# Patient Record
Sex: MALE | Race: WHITE | NOT HISPANIC OR LATINO
[De-identification: names, ages, dates, MRNs, and addresses within clinical notes are randomized per-mention and may not be internally consistent; named-entity substitution may affect disease eponyms.]

---

## 2017-02-03 ENCOUNTER — FORM ENCOUNTER (OUTPATIENT)
Age: 77
End: 2017-02-03

## 2017-02-04 ENCOUNTER — APPOINTMENT (OUTPATIENT)
Dept: CT IMAGING | Facility: IMAGING CENTER | Age: 77
End: 2017-02-04

## 2017-02-04 ENCOUNTER — OUTPATIENT (OUTPATIENT)
Dept: OUTPATIENT SERVICES | Facility: HOSPITAL | Age: 77
LOS: 1 days | End: 2017-02-04
Payer: MEDICARE

## 2017-02-04 DIAGNOSIS — C7A.8 OTHER MALIGNANT NEUROENDOCRINE TUMORS: ICD-10-CM

## 2017-02-04 PROCEDURE — 71250 CT THORAX DX C-: CPT

## 2017-02-08 ENCOUNTER — OUTPATIENT (OUTPATIENT)
Dept: OUTPATIENT SERVICES | Facility: HOSPITAL | Age: 77
LOS: 1 days | Discharge: HOME | End: 2017-02-08

## 2017-02-08 DIAGNOSIS — C85.88 OTHER SPECIFIED TYPES OF NON-HODGKIN LYMPHOMA, LYMPH NODES OF MULTIPLE SITES: ICD-10-CM

## 2017-02-09 ENCOUNTER — RESULT REVIEW (OUTPATIENT)
Age: 77
End: 2017-02-09

## 2017-02-10 ENCOUNTER — APPOINTMENT (OUTPATIENT)
Dept: HEMATOLOGY ONCOLOGY | Facility: CLINIC | Age: 77
End: 2017-02-10

## 2017-02-10 VITALS
HEART RATE: 59 BPM | RESPIRATION RATE: 16 BRPM | WEIGHT: 175.27 LBS | TEMPERATURE: 97.4 F | SYSTOLIC BLOOD PRESSURE: 184 MMHG | BODY MASS INDEX: 26.65 KG/M2 | DIASTOLIC BLOOD PRESSURE: 93 MMHG | OXYGEN SATURATION: 98 %

## 2017-02-10 DIAGNOSIS — R41.3 OTHER AMNESIA: ICD-10-CM

## 2017-02-10 DIAGNOSIS — Z00.00 ENCOUNTER FOR GENERAL ADULT MEDICAL EXAMINATION W/OUT ABNORMAL FINDINGS: ICD-10-CM

## 2017-02-10 LAB
BASOPHILS # BLD AUTO: 0 K/UL — SIGNIFICANT CHANGE UP (ref 0–0.2)
BASOPHILS NFR BLD AUTO: 0.4 % — SIGNIFICANT CHANGE UP (ref 0–2)
EOSINOPHIL # BLD AUTO: 0.2 K/UL — SIGNIFICANT CHANGE UP (ref 0–0.5)
EOSINOPHIL NFR BLD AUTO: 4.7 % — SIGNIFICANT CHANGE UP (ref 0–6)
HCT VFR BLD CALC: 39.4 % — SIGNIFICANT CHANGE UP (ref 39–50)
HGB BLD-MCNC: 13.4 G/DL — SIGNIFICANT CHANGE UP (ref 13–17)
LYMPHOCYTES # BLD AUTO: 0.9 K/UL — LOW (ref 1–3.3)
LYMPHOCYTES # BLD AUTO: 16.7 % — SIGNIFICANT CHANGE UP (ref 13–44)
MCHC RBC-ENTMCNC: 32.7 PG — SIGNIFICANT CHANGE UP (ref 27–34)
MCHC RBC-ENTMCNC: 34 G/DL — SIGNIFICANT CHANGE UP (ref 32–36)
MCV RBC AUTO: 96.2 FL — SIGNIFICANT CHANGE UP (ref 80–100)
MONOCYTES # BLD AUTO: 0.7 K/UL — SIGNIFICANT CHANGE UP (ref 0–0.9)
MONOCYTES NFR BLD AUTO: 13.9 % — SIGNIFICANT CHANGE UP (ref 2–14)
NEUTROPHILS # BLD AUTO: 3.4 K/UL — SIGNIFICANT CHANGE UP (ref 1.8–7.4)
NEUTROPHILS NFR BLD AUTO: 64.3 % — SIGNIFICANT CHANGE UP (ref 43–77)
PLATELET # BLD AUTO: 91 K/UL — LOW (ref 150–400)
RBC # BLD: 4.1 M/UL — LOW (ref 4.2–5.8)
RBC # FLD: 13.6 % — SIGNIFICANT CHANGE UP (ref 10.3–14.5)
WBC # BLD: 5.3 K/UL — SIGNIFICANT CHANGE UP (ref 3.8–10.5)
WBC # FLD AUTO: 5.3 K/UL — SIGNIFICANT CHANGE UP (ref 3.8–10.5)

## 2017-02-11 LAB
ALBUMIN SERPL ELPH-MCNC: 4.4 G/DL
ALP BLD-CCNC: 40 U/L
ALT SERPL-CCNC: 24 U/L
ANION GAP SERPL CALC-SCNC: 15 MMOL/L
AST SERPL-CCNC: 20 U/L
B2 MICROGLOB SERPL-MCNC: 4.5 MG/L
BILIRUB SERPL-MCNC: 0.7 MG/DL
BUN SERPL-MCNC: 19 MG/DL
CALCIUM SERPL-MCNC: 9.6 MG/DL
CHLORIDE SERPL-SCNC: 100 MMOL/L
CO2 SERPL-SCNC: 25 MMOL/L
CREAT SERPL-MCNC: 1.44 MG/DL
CRP SERPL-MCNC: 0.66 MG/DL
GLUCOSE SERPL-MCNC: 123 MG/DL
LDH SERPL-CCNC: 126 U/L
POTASSIUM SERPL-SCNC: 5.1 MMOL/L
PROT SERPL-MCNC: 6.9 G/DL
SODIUM SERPL-SCNC: 140 MMOL/L

## 2017-02-27 ENCOUNTER — APPOINTMENT (OUTPATIENT)
Dept: UROLOGY | Facility: CLINIC | Age: 77
End: 2017-02-27

## 2017-03-02 LAB
APPEARANCE: ABNORMAL
BACTERIA: ABNORMAL
BILIRUBIN URINE: NEGATIVE
BLOOD URINE: ABNORMAL
COLOR: YELLOW
GLUCOSE QUALITATIVE U: NORMAL MG/DL
HYALINE CASTS: 0 /LPF
KETONES URINE: NEGATIVE
LEUKOCYTE ESTERASE URINE: ABNORMAL
MICROSCOPIC-UA: NORMAL
NITRITE URINE: NEGATIVE
PH URINE: 5.5
PROTEIN URINE: 30 MG/DL
RED BLOOD CELLS URINE: 13 /HPF
SPECIFIC GRAVITY URINE: 1.03
SQUAMOUS EPITHELIAL CELLS: 4 /HPF
URINE COMMENTS: NORMAL
UROBILINOGEN URINE: NORMAL MG/DL
WHITE BLOOD CELLS URINE: 159 /HPF

## 2017-03-10 ENCOUNTER — NON-APPOINTMENT (OUTPATIENT)
Age: 77
End: 2017-03-10

## 2017-03-10 ENCOUNTER — APPOINTMENT (OUTPATIENT)
Dept: CARDIOLOGY | Facility: CLINIC | Age: 77
End: 2017-03-10

## 2017-03-10 VITALS
BODY MASS INDEX: 26.52 KG/M2 | SYSTOLIC BLOOD PRESSURE: 151 MMHG | HEIGHT: 68 IN | HEART RATE: 79 BPM | DIASTOLIC BLOOD PRESSURE: 81 MMHG | WEIGHT: 175 LBS

## 2017-03-27 ENCOUNTER — RESULT REVIEW (OUTPATIENT)
Age: 77
End: 2017-03-27

## 2017-03-27 ENCOUNTER — OUTPATIENT (OUTPATIENT)
Dept: OUTPATIENT SERVICES | Facility: HOSPITAL | Age: 77
LOS: 1 days | Discharge: HOME | End: 2017-03-27

## 2017-03-27 DIAGNOSIS — C85.88 OTHER SPECIFIED TYPES OF NON-HODGKIN LYMPHOMA, LYMPH NODES OF MULTIPLE SITES: ICD-10-CM

## 2017-03-28 ENCOUNTER — LABORATORY RESULT (OUTPATIENT)
Age: 77
End: 2017-03-28

## 2017-03-28 ENCOUNTER — APPOINTMENT (OUTPATIENT)
Dept: HEMATOLOGY ONCOLOGY | Facility: CLINIC | Age: 77
End: 2017-03-28

## 2017-03-28 VITALS
RESPIRATION RATE: 16 BRPM | HEART RATE: 82 BPM | TEMPERATURE: 97 F | DIASTOLIC BLOOD PRESSURE: 72 MMHG | WEIGHT: 174.17 LBS | BODY MASS INDEX: 26.48 KG/M2 | SYSTOLIC BLOOD PRESSURE: 130 MMHG

## 2017-03-28 DIAGNOSIS — D69.6 THROMBOCYTOPENIA, UNSPECIFIED: ICD-10-CM

## 2017-03-28 DIAGNOSIS — N39.0 URINARY TRACT INFECTION, SITE NOT SPECIFIED: ICD-10-CM

## 2017-03-28 LAB
BASOPHILS # BLD AUTO: 0 K/UL — SIGNIFICANT CHANGE UP (ref 0–0.2)
BASOPHILS NFR BLD AUTO: 0.1 % — SIGNIFICANT CHANGE UP (ref 0–2)
EOSINOPHIL # BLD AUTO: 0.4 K/UL — SIGNIFICANT CHANGE UP (ref 0–0.5)
EOSINOPHIL NFR BLD AUTO: 5.3 % — SIGNIFICANT CHANGE UP (ref 0–6)
HCT VFR BLD CALC: 40.9 % — SIGNIFICANT CHANGE UP (ref 39–50)
HGB BLD-MCNC: 14.5 G/DL — SIGNIFICANT CHANGE UP (ref 13–17)
LYMPHOCYTES # BLD AUTO: 1.4 K/UL — SIGNIFICANT CHANGE UP (ref 1–3.3)
LYMPHOCYTES # BLD AUTO: 17.8 % — SIGNIFICANT CHANGE UP (ref 13–44)
MCHC RBC-ENTMCNC: 32.7 PG — SIGNIFICANT CHANGE UP (ref 27–34)
MCHC RBC-ENTMCNC: 35.5 G/DL — SIGNIFICANT CHANGE UP (ref 32–36)
MCV RBC AUTO: 92.1 FL — SIGNIFICANT CHANGE UP (ref 80–100)
MONOCYTES # BLD AUTO: 0.7 K/UL — SIGNIFICANT CHANGE UP (ref 0–0.9)
MONOCYTES NFR BLD AUTO: 8.6 % — SIGNIFICANT CHANGE UP (ref 2–14)
NEUTROPHILS # BLD AUTO: 5.2 K/UL — SIGNIFICANT CHANGE UP (ref 1.8–7.4)
NEUTROPHILS NFR BLD AUTO: 68.2 % — SIGNIFICANT CHANGE UP (ref 43–77)
PLATELET # BLD AUTO: 124 K/UL — LOW (ref 150–400)
RBC # BLD: 4.44 M/UL — SIGNIFICANT CHANGE UP (ref 4.2–5.8)
RBC # FLD: 12.7 % — SIGNIFICANT CHANGE UP (ref 10.3–14.5)
WBC # BLD: 7.7 K/UL — SIGNIFICANT CHANGE UP (ref 3.8–10.5)
WBC # FLD AUTO: 7.7 K/UL — SIGNIFICANT CHANGE UP (ref 3.8–10.5)

## 2017-04-03 LAB
ALBUMIN SERPL ELPH-MCNC: 4.4 G/DL
ALP BLD-CCNC: 50 U/L
ALT SERPL-CCNC: 15 U/L
ANION GAP SERPL CALC-SCNC: 18 MMOL/L
APPEARANCE: ABNORMAL
APPEARANCE: CLEAR
AST SERPL-CCNC: 17 U/L
BACTERIA: NEGATIVE
BILIRUB SERPL-MCNC: 0.6 MG/DL
BILIRUBIN URINE: NEGATIVE
BILIRUBIN URINE: NEGATIVE
BLOOD URINE: NEGATIVE
BLOOD URINE: NEGATIVE
BUN SERPL-MCNC: 24 MG/DL
CALCIUM SERPL-MCNC: 10 MG/DL
CHLORIDE SERPL-SCNC: 103 MMOL/L
CO2 SERPL-SCNC: 20 MMOL/L
COLOR: YELLOW
COLOR: YELLOW
CREAT SERPL-MCNC: 1.66 MG/DL
GLUCOSE QUALITATIVE U: NORMAL MG/DL
GLUCOSE QUALITATIVE U: NORMAL MG/DL
GLUCOSE SERPL-MCNC: 153 MG/DL
HYALINE CASTS: 2 /LPF
KETONES URINE: NEGATIVE
KETONES URINE: NEGATIVE
LDH SERPL-CCNC: 129 U/L
LEUKOCYTE ESTERASE URINE: ABNORMAL
LEUKOCYTE ESTERASE URINE: ABNORMAL
MICROSCOPIC-UA: NORMAL
NITRITE URINE: NEGATIVE
NITRITE URINE: NEGATIVE
PH URINE: 5.5
PH URINE: 5.5
POTASSIUM SERPL-SCNC: 5 MMOL/L
PROT SERPL-MCNC: 7.4 G/DL
PROTEIN URINE: ABNORMAL MG/DL
PROTEIN URINE: ABNORMAL MG/DL
PSA FREE FLD-MCNC: 14.4 %
PSA FREE SERPL-MCNC: 0.07 NG/ML
PSA SERPL-MCNC: 0.51 NG/ML
RED BLOOD CELLS URINE: 3 /HPF
SODIUM SERPL-SCNC: 140 MMOL/L
SPECIFIC GRAVITY URINE: 1.02
SPECIFIC GRAVITY URINE: 1.02
SQUAMOUS EPITHELIAL CELLS: 1 /HPF
UROBILINOGEN URINE: NORMAL MG/DL
UROBILINOGEN URINE: NORMAL MG/DL
WHITE BLOOD CELLS URINE: 84 /HPF

## 2017-04-05 ENCOUNTER — APPOINTMENT (OUTPATIENT)
Dept: CV DIAGNOSITCS | Facility: HOSPITAL | Age: 77
End: 2017-04-05

## 2017-05-10 ENCOUNTER — APPOINTMENT (OUTPATIENT)
Dept: UROLOGY | Facility: CLINIC | Age: 77
End: 2017-05-10

## 2017-05-10 DIAGNOSIS — R31.0 GROSS HEMATURIA: ICD-10-CM

## 2017-05-12 LAB
APPEARANCE: ABNORMAL
BACTERIA: ABNORMAL
BILIRUBIN URINE: NEGATIVE
BLOOD URINE: NEGATIVE
COLOR: YELLOW
GLUCOSE QUALITATIVE U: NORMAL MG/DL
HYALINE CASTS: 0 /LPF
KETONES URINE: NEGATIVE
LEUKOCYTE ESTERASE URINE: ABNORMAL
MICROSCOPIC-UA: NORMAL
NITRITE URINE: NEGATIVE
PH URINE: 5.5
PROTEIN URINE: 30 MG/DL
RED BLOOD CELLS URINE: 2 /HPF
SPECIFIC GRAVITY URINE: 1.02
SQUAMOUS EPITHELIAL CELLS: 1 /HPF
UROBILINOGEN URINE: NORMAL MG/DL
WHITE BLOOD CELLS URINE: 128 /HPF

## 2017-06-03 ENCOUNTER — APPOINTMENT (OUTPATIENT)
Dept: CT IMAGING | Facility: IMAGING CENTER | Age: 77
End: 2017-06-03

## 2017-08-07 ENCOUNTER — FORM ENCOUNTER (OUTPATIENT)
Age: 77
End: 2017-08-07

## 2017-08-07 ENCOUNTER — OUTPATIENT (OUTPATIENT)
Dept: OUTPATIENT SERVICES | Facility: HOSPITAL | Age: 77
LOS: 1 days | Discharge: HOME | End: 2017-08-07

## 2017-08-07 DIAGNOSIS — C85.88 OTHER SPECIFIED TYPES OF NON-HODGKIN LYMPHOMA, LYMPH NODES OF MULTIPLE SITES: ICD-10-CM

## 2017-08-08 ENCOUNTER — OUTPATIENT (OUTPATIENT)
Dept: OUTPATIENT SERVICES | Facility: HOSPITAL | Age: 77
LOS: 1 days | End: 2017-08-08
Payer: MEDICARE

## 2017-08-08 ENCOUNTER — RESULT REVIEW (OUTPATIENT)
Age: 77
End: 2017-08-08

## 2017-08-08 ENCOUNTER — APPOINTMENT (OUTPATIENT)
Dept: CT IMAGING | Facility: IMAGING CENTER | Age: 77
End: 2017-08-08
Payer: MEDICARE

## 2017-08-08 ENCOUNTER — APPOINTMENT (OUTPATIENT)
Dept: HEMATOLOGY ONCOLOGY | Facility: CLINIC | Age: 77
End: 2017-08-08

## 2017-08-08 DIAGNOSIS — C34.91 MALIGNANT NEOPLASM OF UNSPECIFIED PART OF RIGHT BRONCHUS OR LUNG: ICD-10-CM

## 2017-08-08 LAB
ALBUMIN SERPL ELPH-MCNC: 4.6 G/DL
ALP BLD-CCNC: 40 U/L
ALT SERPL-CCNC: 18 U/L
ANION GAP SERPL CALC-SCNC: 18 MMOL/L
AST SERPL-CCNC: 21 U/L
B2 MICROGLOB SERPL-MCNC: 5.6 MG/L
BASOPHILS # BLD AUTO: 0 K/UL — SIGNIFICANT CHANGE UP (ref 0–0.2)
BASOPHILS NFR BLD AUTO: 0.4 % — SIGNIFICANT CHANGE UP (ref 0–2)
BILIRUB SERPL-MCNC: 1.3 MG/DL
BUN SERPL-MCNC: 21 MG/DL
CALCIUM SERPL-MCNC: 10.2 MG/DL
CHLORIDE SERPL-SCNC: 99 MMOL/L
CO2 SERPL-SCNC: 21 MMOL/L
CREAT SERPL-MCNC: 1.85 MG/DL
CRP SERPL-MCNC: 1 MG/DL
EOSINOPHIL # BLD AUTO: 0.2 K/UL — SIGNIFICANT CHANGE UP (ref 0–0.5)
EOSINOPHIL NFR BLD AUTO: 4.3 % — SIGNIFICANT CHANGE UP (ref 0–6)
GLUCOSE SERPL-MCNC: 126 MG/DL
HCT VFR BLD CALC: 42 % — SIGNIFICANT CHANGE UP (ref 39–50)
HGB BLD-MCNC: 13.9 G/DL — SIGNIFICANT CHANGE UP (ref 13–17)
LDH SERPL-CCNC: 147 U/L
LYMPHOCYTES # BLD AUTO: 0.8 K/UL — LOW (ref 1–3.3)
LYMPHOCYTES # BLD AUTO: 18.3 % — SIGNIFICANT CHANGE UP (ref 13–44)
MCHC RBC-ENTMCNC: 32.1 PG — SIGNIFICANT CHANGE UP (ref 27–34)
MCHC RBC-ENTMCNC: 33.1 G/DL — SIGNIFICANT CHANGE UP (ref 32–36)
MCV RBC AUTO: 97.2 FL — SIGNIFICANT CHANGE UP (ref 80–100)
MONOCYTES # BLD AUTO: 0.6 K/UL — SIGNIFICANT CHANGE UP (ref 0–0.9)
MONOCYTES NFR BLD AUTO: 13 % — SIGNIFICANT CHANGE UP (ref 2–14)
NEUTROPHILS # BLD AUTO: 2.9 K/UL — SIGNIFICANT CHANGE UP (ref 1.8–7.4)
NEUTROPHILS NFR BLD AUTO: 64.1 % — SIGNIFICANT CHANGE UP (ref 43–77)
PLATELET # BLD AUTO: 84 K/UL — LOW (ref 150–400)
POTASSIUM SERPL-SCNC: 5 MMOL/L
PROT SERPL-MCNC: 7.4 G/DL
RBC # BLD: 4.32 M/UL — SIGNIFICANT CHANGE UP (ref 4.2–5.8)
RBC # FLD: 15.4 % — HIGH (ref 10.3–14.5)
SODIUM SERPL-SCNC: 138 MMOL/L
URATE SERPL-MCNC: 7.5 MG/DL
WBC # BLD: 4.6 K/UL — SIGNIFICANT CHANGE UP (ref 3.8–10.5)
WBC # FLD AUTO: 4.6 K/UL — SIGNIFICANT CHANGE UP (ref 3.8–10.5)

## 2017-08-08 PROCEDURE — 74176 CT ABD & PELVIS W/O CONTRAST: CPT | Mod: 26

## 2017-08-08 PROCEDURE — 71250 CT THORAX DX C-: CPT | Mod: 26

## 2017-08-08 PROCEDURE — 71250 CT THORAX DX C-: CPT

## 2017-08-08 PROCEDURE — 74176 CT ABD & PELVIS W/O CONTRAST: CPT

## 2017-08-12 LAB — CEA SERPL-MCNC: 1.3 NG/ML

## 2017-09-06 ENCOUNTER — APPOINTMENT (OUTPATIENT)
Dept: UROLOGY | Facility: CLINIC | Age: 77
End: 2017-09-06
Payer: MEDICARE

## 2017-09-06 PROCEDURE — 99213 OFFICE O/P EST LOW 20 MIN: CPT

## 2017-09-06 PROCEDURE — 51798 US URINE CAPACITY MEASURE: CPT

## 2017-09-11 LAB
CREAT SERPL-MCNC: 1.61 MG/DL
PSA FREE FLD-MCNC: 14.8
PSA FREE SERPL-MCNC: 0.08 NG/ML
PSA SERPL-MCNC: 0.54 NG/ML
TESTOST SERPL-MCNC: 289.8 NG/DL

## 2017-09-26 ENCOUNTER — APPOINTMENT (OUTPATIENT)
Dept: NEUROLOGY | Facility: CLINIC | Age: 77
End: 2017-09-26
Payer: MEDICARE

## 2017-09-26 VITALS
BODY MASS INDEX: 26.37 KG/M2 | HEIGHT: 68 IN | DIASTOLIC BLOOD PRESSURE: 79 MMHG | SYSTOLIC BLOOD PRESSURE: 145 MMHG | HEART RATE: 71 BPM | WEIGHT: 174 LBS

## 2017-09-26 PROCEDURE — 99205 OFFICE O/P NEW HI 60 MIN: CPT

## 2017-10-05 DIAGNOSIS — R42 DIZZINESS AND GIDDINESS: ICD-10-CM

## 2017-10-05 DIAGNOSIS — R26.9 UNSPECIFIED ABNORMALITIES OF GAIT AND MOBILITY: ICD-10-CM

## 2017-11-13 ENCOUNTER — APPOINTMENT (OUTPATIENT)
Dept: UROLOGY | Facility: CLINIC | Age: 77
End: 2017-11-13

## 2017-11-22 ENCOUNTER — OUTPATIENT (OUTPATIENT)
Dept: OUTPATIENT SERVICES | Facility: HOSPITAL | Age: 77
LOS: 1 days | Discharge: HOME | End: 2017-11-22

## 2017-11-22 DIAGNOSIS — C85.88 OTHER SPECIFIED TYPES OF NON-HODGKIN LYMPHOMA, LYMPH NODES OF MULTIPLE SITES: ICD-10-CM

## 2017-11-28 ENCOUNTER — APPOINTMENT (OUTPATIENT)
Dept: HEMATOLOGY ONCOLOGY | Facility: CLINIC | Age: 77
End: 2017-11-28

## 2017-12-03 ENCOUNTER — FORM ENCOUNTER (OUTPATIENT)
Age: 77
End: 2017-12-03

## 2017-12-04 ENCOUNTER — APPOINTMENT (OUTPATIENT)
Dept: CT IMAGING | Facility: IMAGING CENTER | Age: 77
End: 2017-12-04
Payer: MEDICARE

## 2017-12-04 ENCOUNTER — OUTPATIENT (OUTPATIENT)
Dept: OUTPATIENT SERVICES | Facility: HOSPITAL | Age: 77
LOS: 1 days | End: 2017-12-04
Payer: MEDICARE

## 2017-12-04 DIAGNOSIS — C34.91 MALIGNANT NEOPLASM OF UNSPECIFIED PART OF RIGHT BRONCHUS OR LUNG: ICD-10-CM

## 2017-12-04 PROCEDURE — 74177 CT ABD & PELVIS W/CONTRAST: CPT

## 2017-12-04 PROCEDURE — 71260 CT THORAX DX C+: CPT | Mod: 26

## 2017-12-04 PROCEDURE — 71260 CT THORAX DX C+: CPT

## 2017-12-04 PROCEDURE — 82565 ASSAY OF CREATININE: CPT

## 2017-12-04 PROCEDURE — 74177 CT ABD & PELVIS W/CONTRAST: CPT | Mod: 26

## 2017-12-08 ENCOUNTER — APPOINTMENT (OUTPATIENT)
Dept: HEMATOLOGY ONCOLOGY | Facility: CLINIC | Age: 77
End: 2017-12-08

## 2017-12-13 ENCOUNTER — APPOINTMENT (OUTPATIENT)
Dept: UROLOGY | Facility: CLINIC | Age: 77
End: 2017-12-13

## 2018-01-17 ENCOUNTER — APPOINTMENT (OUTPATIENT)
Dept: UROLOGY | Facility: CLINIC | Age: 78
End: 2018-01-17
Payer: MEDICARE

## 2018-01-17 PROCEDURE — 51798 US URINE CAPACITY MEASURE: CPT

## 2018-01-17 PROCEDURE — 99213 OFFICE O/P EST LOW 20 MIN: CPT

## 2018-01-19 LAB
PSA FREE FLD-MCNC: 13.3
PSA FREE SERPL-MCNC: 0.12 NG/ML
PSA SERPL-MCNC: 0.9 NG/ML
TESTOST SERPL-MCNC: 239.9 NG/DL

## 2018-02-15 ENCOUNTER — FORM ENCOUNTER (OUTPATIENT)
Age: 78
End: 2018-02-15

## 2018-02-16 ENCOUNTER — OUTPATIENT (OUTPATIENT)
Dept: OUTPATIENT SERVICES | Facility: HOSPITAL | Age: 78
LOS: 1 days | End: 2018-02-16
Payer: MEDICARE

## 2018-02-16 ENCOUNTER — APPOINTMENT (OUTPATIENT)
Dept: CT IMAGING | Facility: IMAGING CENTER | Age: 78
End: 2018-02-16
Payer: MEDICARE

## 2018-02-16 DIAGNOSIS — C34.91 MALIGNANT NEOPLASM OF UNSPECIFIED PART OF RIGHT BRONCHUS OR LUNG: ICD-10-CM

## 2018-02-16 PROCEDURE — 71260 CT THORAX DX C+: CPT

## 2018-02-16 PROCEDURE — 71260 CT THORAX DX C+: CPT | Mod: 26

## 2018-02-16 PROCEDURE — 74177 CT ABD & PELVIS W/CONTRAST: CPT

## 2018-02-16 PROCEDURE — 74177 CT ABD & PELVIS W/CONTRAST: CPT | Mod: 26

## 2018-02-16 PROCEDURE — 82565 ASSAY OF CREATININE: CPT

## 2018-02-21 ENCOUNTER — APPOINTMENT (OUTPATIENT)
Dept: ULTRASOUND IMAGING | Facility: IMAGING CENTER | Age: 78
End: 2018-02-21
Payer: MEDICARE

## 2018-02-21 ENCOUNTER — OUTPATIENT (OUTPATIENT)
Dept: OUTPATIENT SERVICES | Facility: HOSPITAL | Age: 78
LOS: 1 days | End: 2018-02-21
Payer: MEDICARE

## 2018-02-21 DIAGNOSIS — Z00.8 ENCOUNTER FOR OTHER GENERAL EXAMINATION: ICD-10-CM

## 2018-02-21 PROCEDURE — 76700 US EXAM ABDOM COMPLETE: CPT

## 2018-02-21 PROCEDURE — 76700 US EXAM ABDOM COMPLETE: CPT | Mod: 26

## 2018-04-03 ENCOUNTER — OUTPATIENT (OUTPATIENT)
Dept: OUTPATIENT SERVICES | Facility: HOSPITAL | Age: 78
LOS: 1 days | Discharge: HOME | End: 2018-04-03

## 2018-04-03 DIAGNOSIS — C85.88 OTHER SPECIFIED TYPES OF NON-HODGKIN LYMPHOMA, LYMPH NODES OF MULTIPLE SITES: ICD-10-CM

## 2018-04-06 ENCOUNTER — RESULT REVIEW (OUTPATIENT)
Age: 78
End: 2018-04-06

## 2018-04-06 ENCOUNTER — APPOINTMENT (OUTPATIENT)
Dept: HEMATOLOGY ONCOLOGY | Facility: CLINIC | Age: 78
End: 2018-04-06
Payer: MEDICARE

## 2018-04-06 VITALS
DIASTOLIC BLOOD PRESSURE: 80 MMHG | SYSTOLIC BLOOD PRESSURE: 128 MMHG | BODY MASS INDEX: 26.61 KG/M2 | TEMPERATURE: 97.9 F | OXYGEN SATURATION: 98 % | HEART RATE: 78 BPM | RESPIRATION RATE: 16 BRPM | WEIGHT: 175 LBS

## 2018-04-06 DIAGNOSIS — R13.10 DYSPHAGIA, UNSPECIFIED: ICD-10-CM

## 2018-04-06 LAB
BASOPHILS # BLD AUTO: 0 K/UL — SIGNIFICANT CHANGE UP (ref 0–0.2)
BASOPHILS NFR BLD AUTO: 0.3 % — SIGNIFICANT CHANGE UP (ref 0–2)
EOSINOPHIL # BLD AUTO: 0.2 K/UL — SIGNIFICANT CHANGE UP (ref 0–0.5)
EOSINOPHIL NFR BLD AUTO: 4.1 % — SIGNIFICANT CHANGE UP (ref 0–6)
HCT VFR BLD CALC: 39.7 % — SIGNIFICANT CHANGE UP (ref 39–50)
HGB BLD-MCNC: 14 G/DL — SIGNIFICANT CHANGE UP (ref 13–17)
LYMPHOCYTES # BLD AUTO: 1 K/UL — SIGNIFICANT CHANGE UP (ref 1–3.3)
LYMPHOCYTES # BLD AUTO: 15.8 % — SIGNIFICANT CHANGE UP (ref 13–44)
MCHC RBC-ENTMCNC: 32 PG — SIGNIFICANT CHANGE UP (ref 27–34)
MCHC RBC-ENTMCNC: 35.2 G/DL — SIGNIFICANT CHANGE UP (ref 32–36)
MCV RBC AUTO: 90.9 FL — SIGNIFICANT CHANGE UP (ref 80–100)
MONOCYTES # BLD AUTO: 0.6 K/UL — SIGNIFICANT CHANGE UP (ref 0–0.9)
MONOCYTES NFR BLD AUTO: 10.7 % — SIGNIFICANT CHANGE UP (ref 2–14)
NEUTROPHILS # BLD AUTO: 4.1 K/UL — SIGNIFICANT CHANGE UP (ref 1.8–7.4)
NEUTROPHILS NFR BLD AUTO: 69.1 % — SIGNIFICANT CHANGE UP (ref 43–77)
PLATELET # BLD AUTO: 101 K/UL — LOW (ref 150–400)
RBC # BLD: 4.37 M/UL — SIGNIFICANT CHANGE UP (ref 4.2–5.8)
RBC # FLD: 12.9 % — SIGNIFICANT CHANGE UP (ref 10.3–14.5)
WBC # BLD: 6 K/UL — SIGNIFICANT CHANGE UP (ref 3.8–10.5)
WBC # FLD AUTO: 6 K/UL — SIGNIFICANT CHANGE UP (ref 3.8–10.5)

## 2018-04-06 PROCEDURE — 99215 OFFICE O/P EST HI 40 MIN: CPT

## 2018-04-08 LAB
ALBUMIN SERPL ELPH-MCNC: 4.5 G/DL
ALP BLD-CCNC: 46 U/L
ALT SERPL-CCNC: 12 U/L
ANION GAP SERPL CALC-SCNC: 16 MMOL/L
AST SERPL-CCNC: 13 U/L
BILIRUB SERPL-MCNC: 0.7 MG/DL
BUN SERPL-MCNC: 22 MG/DL
CALCIUM SERPL-MCNC: 10.5 MG/DL
CEA SERPL-MCNC: 1.4 NG/ML
CHLORIDE SERPL-SCNC: 100 MMOL/L
CO2 SERPL-SCNC: 21 MMOL/L
CREAT SERPL-MCNC: 1.58 MG/DL
ESTIMATED AVERAGE GLUCOSE: 146 MG/DL
GLUCOSE SERPL-MCNC: 152 MG/DL
HBA1C MFR BLD HPLC: 6.7 %
LDH SERPL-CCNC: 125 U/L
POTASSIUM SERPL-SCNC: 4.5 MMOL/L
PROT SERPL-MCNC: 7.4 G/DL
PSA SERPL-MCNC: 0.45 NG/ML
SODIUM SERPL-SCNC: 137 MMOL/L

## 2018-04-18 ENCOUNTER — APPOINTMENT (OUTPATIENT)
Dept: PHYSICAL MEDICINE AND REHAB | Facility: CLINIC | Age: 78
End: 2018-04-18

## 2018-04-18 ENCOUNTER — APPOINTMENT (OUTPATIENT)
Dept: PHYSICAL MEDICINE AND REHAB | Facility: CLINIC | Age: 78
End: 2018-04-18
Payer: MEDICARE

## 2018-04-18 VITALS
TEMPERATURE: 97.1 F | HEART RATE: 70 BPM | OXYGEN SATURATION: 96 % | DIASTOLIC BLOOD PRESSURE: 71 MMHG | SYSTOLIC BLOOD PRESSURE: 125 MMHG

## 2018-04-18 PROCEDURE — 99204 OFFICE O/P NEW MOD 45 MIN: CPT

## 2018-05-07 ENCOUNTER — FORM ENCOUNTER (OUTPATIENT)
Age: 78
End: 2018-05-07

## 2018-05-08 ENCOUNTER — APPOINTMENT (OUTPATIENT)
Dept: RADIOLOGY | Facility: IMAGING CENTER | Age: 78
End: 2018-05-08
Payer: MEDICARE

## 2018-05-08 ENCOUNTER — OUTPATIENT (OUTPATIENT)
Dept: OUTPATIENT SERVICES | Facility: HOSPITAL | Age: 78
LOS: 1 days | End: 2018-05-08
Payer: MEDICARE

## 2018-05-08 DIAGNOSIS — Z00.8 ENCOUNTER FOR OTHER GENERAL EXAMINATION: ICD-10-CM

## 2018-05-08 PROCEDURE — 71046 X-RAY EXAM CHEST 2 VIEWS: CPT | Mod: 26

## 2018-05-08 PROCEDURE — 71046 X-RAY EXAM CHEST 2 VIEWS: CPT

## 2018-05-16 ENCOUNTER — APPOINTMENT (OUTPATIENT)
Dept: PHYSICAL MEDICINE AND REHAB | Facility: CLINIC | Age: 78
End: 2018-05-16

## 2018-05-23 ENCOUNTER — APPOINTMENT (OUTPATIENT)
Dept: PHYSICAL MEDICINE AND REHAB | Facility: CLINIC | Age: 78
End: 2018-05-23

## 2018-08-24 ENCOUNTER — APPOINTMENT (OUTPATIENT)
Dept: CARDIOTHORACIC SURGERY | Facility: CLINIC | Age: 78
End: 2018-08-24
Payer: MEDICARE

## 2018-08-27 ENCOUNTER — NON-APPOINTMENT (OUTPATIENT)
Age: 78
End: 2018-08-27

## 2018-08-27 ENCOUNTER — APPOINTMENT (OUTPATIENT)
Dept: CARDIOTHORACIC SURGERY | Facility: CLINIC | Age: 78
End: 2018-08-27
Payer: MEDICARE

## 2018-08-27 VITALS
HEART RATE: 73 BPM | BODY MASS INDEX: 26.67 KG/M2 | WEIGHT: 176 LBS | DIASTOLIC BLOOD PRESSURE: 81 MMHG | SYSTOLIC BLOOD PRESSURE: 131 MMHG | RESPIRATION RATE: 14 BRPM | HEIGHT: 68 IN | TEMPERATURE: 98.2 F | OXYGEN SATURATION: 99 %

## 2018-08-27 DIAGNOSIS — Z01.810 ENCOUNTER FOR PREPROCEDURAL CARDIOVASCULAR EXAMINATION: ICD-10-CM

## 2018-08-27 PROCEDURE — 99214 OFFICE O/P EST MOD 30 MIN: CPT | Mod: 25

## 2018-08-27 PROCEDURE — 93000 ELECTROCARDIOGRAM COMPLETE: CPT

## 2018-08-27 RX ORDER — TAMSULOSIN HYDROCHLORIDE 0.4 MG/1
0.4 CAPSULE ORAL
Qty: 90 | Refills: 3 | Status: DISCONTINUED | COMMUNITY
Start: 2018-01-17 | End: 2018-08-27

## 2018-08-27 RX ORDER — HYDROCODONE BITARTRATE AND ACETAMINOPHEN 5; 325 MG/1; MG/1
5-325 TABLET ORAL
Qty: 12 | Refills: 0 | Status: DISCONTINUED | COMMUNITY
Start: 2018-07-10

## 2018-08-27 RX ORDER — GABAPENTIN 600 MG/1
600 TABLET, COATED ORAL
Qty: 60 | Refills: 0 | Status: DISCONTINUED | COMMUNITY
Start: 2017-10-31

## 2018-08-27 RX ORDER — CIPROFLOXACIN HYDROCHLORIDE 500 MG/1
500 TABLET, FILM COATED ORAL TWICE DAILY
Qty: 14 | Refills: 0 | Status: DISCONTINUED | COMMUNITY
Start: 2017-03-31 | End: 2018-08-27

## 2018-08-27 RX ORDER — ACETAMINOPHEN AND CODEINE 300; 30 MG/1; MG/1
300-30 TABLET ORAL 4 TIMES DAILY
Qty: 20 | Refills: 0 | Status: DISCONTINUED | COMMUNITY
Start: 2018-04-24 | End: 2018-08-27

## 2018-08-27 RX ORDER — GABAPENTIN 300 MG/1
300 CAPSULE ORAL
Qty: 90 | Refills: 0 | Status: DISCONTINUED | COMMUNITY
Start: 2018-04-18 | End: 2018-08-27

## 2018-08-27 RX ORDER — BETAMETHASONE DIPROPIONATE 0.5 MG/G
0.05 CREAM TOPICAL
Qty: 45 | Refills: 0 | Status: DISCONTINUED | COMMUNITY
Start: 2018-04-04

## 2018-08-27 RX ORDER — FLUTICASONE PROPIONATE 50 UG/1
50 SPRAY, METERED NASAL
Qty: 16 | Refills: 0 | Status: DISCONTINUED | COMMUNITY
Start: 2018-04-23

## 2018-08-27 RX ORDER — ALBUTEROL SULFATE 90 UG/1
108 (90 BASE) AEROSOL, METERED RESPIRATORY (INHALATION)
Qty: 8 | Refills: 0 | Status: DISCONTINUED | COMMUNITY
Start: 2017-11-17

## 2018-08-27 RX ORDER — TRAMADOL HYDROCHLORIDE AND ACETAMINOPHEN 37.5; 325 MG/1; MG/1
37.5-325 TABLET, FILM COATED ORAL
Qty: 60 | Refills: 0 | Status: DISCONTINUED | COMMUNITY
Start: 2018-04-06 | End: 2018-08-27

## 2018-08-27 RX ORDER — TRAMADOL HYDROCHLORIDE 50 MG/1
50 TABLET, COATED ORAL
Qty: 12 | Refills: 0 | Status: DISCONTINUED | COMMUNITY
Start: 2018-06-28

## 2018-08-27 RX ORDER — CARVEDILOL 12.5 MG/1
12.5 TABLET, FILM COATED ORAL
Qty: 60 | Refills: 0 | Status: DISCONTINUED | COMMUNITY
Start: 2018-03-28

## 2018-08-27 RX ORDER — CIPROFLOXACIN 3 MG/ML
0.3 SOLUTION OPHTHALMIC
Qty: 5 | Refills: 0 | Status: DISCONTINUED | COMMUNITY
Start: 2018-08-21

## 2018-08-27 RX ORDER — METHYLPREDNISOLONE 4 MG/1
4 TABLET ORAL
Qty: 21 | Refills: 0 | Status: DISCONTINUED | COMMUNITY
Start: 2018-04-25

## 2018-08-27 RX ORDER — CARVEDILOL 25 MG/1
25 TABLET, FILM COATED ORAL
Qty: 60 | Refills: 5 | Status: DISCONTINUED | COMMUNITY
End: 2018-08-27

## 2018-08-27 RX ORDER — HYDROCORTISONE 1 %
12 CREAM (GRAM) TOPICAL
Qty: 400 | Refills: 0 | Status: DISCONTINUED | COMMUNITY
Start: 2018-03-13

## 2018-08-27 RX ORDER — CILOSTAZOL 100 MG/1
100 TABLET ORAL
Qty: 60 | Refills: 0 | Status: DISCONTINUED | COMMUNITY
Start: 2018-05-15

## 2018-11-04 ENCOUNTER — APPOINTMENT (OUTPATIENT)
Dept: MRI IMAGING | Facility: IMAGING CENTER | Age: 78
End: 2018-11-04
Payer: MEDICARE

## 2018-11-04 ENCOUNTER — OUTPATIENT (OUTPATIENT)
Dept: OUTPATIENT SERVICES | Facility: HOSPITAL | Age: 78
LOS: 1 days | End: 2018-11-04
Payer: MEDICARE

## 2018-11-04 DIAGNOSIS — Z00.8 ENCOUNTER FOR OTHER GENERAL EXAMINATION: ICD-10-CM

## 2018-11-04 PROCEDURE — 73721 MRI JNT OF LWR EXTRE W/O DYE: CPT

## 2018-11-04 PROCEDURE — 73721 MRI JNT OF LWR EXTRE W/O DYE: CPT | Mod: 26,RT

## 2018-11-07 ENCOUNTER — OUTPATIENT (OUTPATIENT)
Dept: OUTPATIENT SERVICES | Facility: HOSPITAL | Age: 78
LOS: 1 days | Discharge: HOME | End: 2018-11-07

## 2018-11-07 DIAGNOSIS — C85.88 OTHER SPECIFIED TYPES OF NON-HODGKIN LYMPHOMA, LYMPH NODES OF MULTIPLE SITES: ICD-10-CM

## 2018-11-16 ENCOUNTER — RESULT REVIEW (OUTPATIENT)
Age: 78
End: 2018-11-16

## 2018-11-16 ENCOUNTER — APPOINTMENT (OUTPATIENT)
Dept: HEMATOLOGY ONCOLOGY | Facility: CLINIC | Age: 78
End: 2018-11-16
Payer: MEDICARE

## 2018-11-16 VITALS
BODY MASS INDEX: 25.81 KG/M2 | RESPIRATION RATE: 16 BRPM | HEART RATE: 71 BPM | TEMPERATURE: 98.1 F | WEIGHT: 169.75 LBS | DIASTOLIC BLOOD PRESSURE: 79 MMHG | OXYGEN SATURATION: 96 % | SYSTOLIC BLOOD PRESSURE: 127 MMHG

## 2018-11-16 LAB
BASOPHILS # BLD AUTO: 0 K/UL — SIGNIFICANT CHANGE UP (ref 0–0.2)
BASOPHILS NFR BLD AUTO: 0.3 % — SIGNIFICANT CHANGE UP (ref 0–2)
EOSINOPHIL # BLD AUTO: 0.3 K/UL — SIGNIFICANT CHANGE UP (ref 0–0.5)
EOSINOPHIL NFR BLD AUTO: 4 % — SIGNIFICANT CHANGE UP (ref 0–6)
HCT VFR BLD CALC: 40.2 % — SIGNIFICANT CHANGE UP (ref 39–50)
HGB BLD-MCNC: 13.6 G/DL — SIGNIFICANT CHANGE UP (ref 13–17)
LYMPHOCYTES # BLD AUTO: 1.2 K/UL — SIGNIFICANT CHANGE UP (ref 1–3.3)
LYMPHOCYTES # BLD AUTO: 19.5 % — SIGNIFICANT CHANGE UP (ref 13–44)
MCHC RBC-ENTMCNC: 30.8 PG — SIGNIFICANT CHANGE UP (ref 27–34)
MCHC RBC-ENTMCNC: 33.9 G/DL — SIGNIFICANT CHANGE UP (ref 32–36)
MCV RBC AUTO: 91.1 FL — SIGNIFICANT CHANGE UP (ref 80–100)
MONOCYTES # BLD AUTO: 0.6 K/UL — SIGNIFICANT CHANGE UP (ref 0–0.9)
MONOCYTES NFR BLD AUTO: 10 % — SIGNIFICANT CHANGE UP (ref 2–14)
NEUTROPHILS # BLD AUTO: 4.2 K/UL — SIGNIFICANT CHANGE UP (ref 1.8–7.4)
NEUTROPHILS NFR BLD AUTO: 66.1 % — SIGNIFICANT CHANGE UP (ref 43–77)
PLATELET # BLD AUTO: 129 K/UL — LOW (ref 150–400)
RBC # BLD: 4.42 M/UL — SIGNIFICANT CHANGE UP (ref 4.2–5.8)
RBC # FLD: 12.4 % — SIGNIFICANT CHANGE UP (ref 10.3–14.5)
WBC # BLD: 6.3 K/UL — SIGNIFICANT CHANGE UP (ref 3.8–10.5)
WBC # FLD AUTO: 6.3 K/UL — SIGNIFICANT CHANGE UP (ref 3.8–10.5)

## 2018-11-16 PROCEDURE — 99214 OFFICE O/P EST MOD 30 MIN: CPT

## 2018-11-16 NOTE — HISTORY OF PRESENT ILLNESS
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 The lung cancer was diagnosed in May 2013, was resected as a T1 lesion and received 4 cycles of cisplatin and etoposide between June and September 2013. 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 Platelet count has been in the 90k/uL range and there is no evidence of bleeding or bruising. 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 I reviewed with radiology -- it showed a 3x2.5 cm mass in 2010 (retrospectively) and now is 6x4 cm. The lesion is homogeneous on scan and is not painful.\par \par I spoke with Dr. Cory cortez 3/24/15 who notes [place] 6 prostate cancer from 11/2014.  He ordered bone scan and will refer to Dr. Cardoza. Patient completed EBRT for 6 weeks in DEC 2015. Has no further diarrhea or blood in stool. Now eating well, weight is stable. \par \par \par 7/20/16 CT of chest c/w: IMPRESSION:  Emphysema  with  right  lower  lobe  prior  wedge  resection  and resolution  of  the  two  nodules  that  were  new  on  the  prior  study.  Left  lower lobe  4  mm  nodule  stable  from  several  prior  studies.  Continued  follow-up recommended.\par \par 2/10/17- CT of chest 2/4/17 without recurrence of disease. Patient reports he continues with arthralgias. Also continues with intermittent dizziness with position changes. He has not seen his cardiologist yet. Patient reports diarrhea has resolved and has been followed up with GI.\par \par 4/6/2018: Patient complain of bilateral lower lumbar pain and radiation down back of right leg from sciatic notch to lateral and anterior thigh. This makes it difficult to walk. Pain has been present for ~ 1 year but now getting worse. While in bed or sitting in chair, there is no pain. Otherwise he has no other new complaints. He had an MRI at [facility] on 3/2/18 ordered by Dr. Eddie Graves.  Various signal changes, arthritic changes and disk issues are seen. Last MRI was done at [facility] 10/2016. CAT 2/2018 of T/A/P showed stable and no evidence of relapse. Patient states he notices food stuck in throat intermittently over the past year. 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 After some "treatment" this has gotten better. Also he complain of right low back and hip pain. He had an injection to back and hip that did not help with Dr. Gutiérrez [phone].  [de-identified] : neuroendocrine, high grade

## 2018-11-16 NOTE — REVIEW OF SYSTEMS
[Fatigue] : fatigue [Constipation] : constipation [Negative] : Allergic/Immunologic [FreeTextEntry9] : back pain is worse

## 2018-11-17 LAB
ALBUMIN SERPL ELPH-MCNC: 4.5 G/DL
ALP BLD-CCNC: 56 U/L
ALT SERPL-CCNC: 10 U/L
ANION GAP SERPL CALC-SCNC: 13 MMOL/L
AST SERPL-CCNC: 11 U/L
BILIRUB SERPL-MCNC: 0.6 MG/DL
BUN SERPL-MCNC: 17 MG/DL
CALCIUM SERPL-MCNC: 10.3 MG/DL
CHLORIDE SERPL-SCNC: 102 MMOL/L
CO2 SERPL-SCNC: 24 MMOL/L
CREAT SERPL-MCNC: 1.34 MG/DL
CRP SERPL-MCNC: 0.52 MG/DL
GLUCOSE SERPL-MCNC: 131 MG/DL
LDH SERPL-CCNC: 128 U/L
POTASSIUM SERPL-SCNC: 5.1 MMOL/L
PROT SERPL-MCNC: 6.8 G/DL
PSA SERPL-MCNC: 0.33 NG/ML
SODIUM SERPL-SCNC: 139 MMOL/L
URATE SERPL-MCNC: 6.5 MG/DL

## 2018-11-28 ENCOUNTER — FORM ENCOUNTER (OUTPATIENT)
Age: 78
End: 2018-11-28

## 2018-11-29 ENCOUNTER — OUTPATIENT (OUTPATIENT)
Dept: OUTPATIENT SERVICES | Facility: HOSPITAL | Age: 78
LOS: 1 days | End: 2018-11-29
Payer: MEDICARE

## 2018-11-29 ENCOUNTER — APPOINTMENT (OUTPATIENT)
Dept: CT IMAGING | Facility: IMAGING CENTER | Age: 78
End: 2018-11-29
Payer: MEDICARE

## 2018-11-29 DIAGNOSIS — C7A.8 OTHER MALIGNANT NEUROENDOCRINE TUMORS: ICD-10-CM

## 2018-11-29 PROCEDURE — 71260 CT THORAX DX C+: CPT | Mod: 26

## 2018-11-29 PROCEDURE — 71260 CT THORAX DX C+: CPT

## 2018-11-29 PROCEDURE — 74177 CT ABD & PELVIS W/CONTRAST: CPT

## 2018-11-29 PROCEDURE — 74177 CT ABD & PELVIS W/CONTRAST: CPT | Mod: 26

## 2018-12-28 ENCOUNTER — APPOINTMENT (OUTPATIENT)
Dept: CARDIOTHORACIC SURGERY | Facility: CLINIC | Age: 78
End: 2018-12-28

## 2019-02-01 ENCOUNTER — APPOINTMENT (OUTPATIENT)
Dept: RADIOLOGY | Facility: IMAGING CENTER | Age: 79
End: 2019-02-01

## 2019-02-01 ENCOUNTER — OUTPATIENT (OUTPATIENT)
Dept: OUTPATIENT SERVICES | Facility: HOSPITAL | Age: 79
LOS: 1 days | End: 2019-02-01
Payer: MEDICARE

## 2019-02-01 DIAGNOSIS — Z00.8 ENCOUNTER FOR OTHER GENERAL EXAMINATION: ICD-10-CM

## 2019-02-01 PROCEDURE — 73502 X-RAY EXAM HIP UNI 2-3 VIEWS: CPT

## 2019-02-01 PROCEDURE — 73502 X-RAY EXAM HIP UNI 2-3 VIEWS: CPT | Mod: 26,RT

## 2019-02-07 ENCOUNTER — OUTPATIENT (OUTPATIENT)
Dept: OUTPATIENT SERVICES | Facility: HOSPITAL | Age: 79
LOS: 1 days | Discharge: HOME | End: 2019-02-07

## 2019-02-07 DIAGNOSIS — C85.88 OTHER SPECIFIED TYPES OF NON-HODGKIN LYMPHOMA, LYMPH NODES OF MULTIPLE SITES: ICD-10-CM

## 2019-02-08 ENCOUNTER — APPOINTMENT (OUTPATIENT)
Dept: HEMATOLOGY ONCOLOGY | Facility: CLINIC | Age: 79
End: 2019-02-08
Payer: MEDICARE

## 2019-02-08 VITALS
TEMPERATURE: 97.8 F | WEIGHT: 169.75 LBS | OXYGEN SATURATION: 96 % | SYSTOLIC BLOOD PRESSURE: 152 MMHG | RESPIRATION RATE: 16 BRPM | BODY MASS INDEX: 25.81 KG/M2 | DIASTOLIC BLOOD PRESSURE: 77 MMHG | HEART RATE: 73 BPM

## 2019-02-08 DIAGNOSIS — R22.41 LOCALIZED SWELLING, MASS AND LUMP, RIGHT LOWER LIMB: ICD-10-CM

## 2019-02-08 PROCEDURE — 99215 OFFICE O/P EST HI 40 MIN: CPT

## 2019-02-08 NOTE — HISTORY OF PRESENT ILLNESS
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 The lung cancer was diagnosed in May 2013, was resected as a T1 lesion and received 4 cycles of cisplatin and etoposide between June and September 2013. 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 Platelet count has been in the 90k/uL range and there is no evidence of bleeding or bruising. 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 I reviewed with radiology -- it showed a 3x2.5 cm mass in 2010 (retrospectively) and now is 6x4 cm. The lesion is homogeneous on scan and is not painful.\par \par I spoke with Dr. Cory cortez 3/24/15 who notes Nan 6 prostate cancer from 11/2014.  He ordered bone scan and will refer to Dr. Cardoza. Patient completed EBRT for 6 weeks in DEC 2015. Has no further diarrhea or blood in stool. Now eating well, weight is stable. \par \par 7/20/16 CT of chest c/w: IMPRESSION:  Emphysema  with  right  lower  lobe  prior  wedge  resection  and resolution  of  the  two  nodules  that  were  new  on  the  prior  study.  Left  lower lobe  4  mm  nodule  stable  from  several  prior  studies.  Continued  follow-up recommended.\par \par 2/10/17- CT of chest 2/4/17 without recurrence of disease. Patient reports he continues with arthralgias. Also continues with intermittent dizziness with position changes. He has not seen his cardiologist yet. Patient reports diarrhea has resolved and has been followed up with GI.\par \par 4/6/2018: Patient complain of bilateral lower lumbar pain and radiation down back of right leg from sciatic notch to lateral and anterior thigh. This makes it difficult to walk. Pain has been present for ~ 1 year but now getting worse. While in bed or sitting in chair, there is no pain. Otherwise he has no other new complaints. He had an MRI at [facility] on 3/2/18 ordered by Dr. Eddie Graves.  Various signal changes, arthritic changes and disk issues are seen. Last MRI was done at [facility] 10/2016. CAT 2/2018 of T/A/P showed stable and no evidence of relapse. Patient states he notices food stuck in throat intermittently over the past year. 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 After some "treatment" this has gotten better. Also he complain of right low back and hip pain. He had an injection to back and hip that did not help with Dr. Gutiérrez [phone]. \par \par 2/8/2019: today patient complain of pain in low back and right hip to anterior thigh. Also notes pain when stands in the left upper anterior thigh when he rises from standing. He saw Dr. Humphrey who is a physiatrist. XRAYs of right hip ordered. This was compared to CAT 11/2018 and showed some chronic changes. Patient advised to see a Neurosurgeon. [de-identified] : neuroendocrine, high grade [de-identified] : Petr Humphrey: [phone]

## 2019-02-08 NOTE — PHYSICAL EXAM
[Restricted in physically strenuous activity but ambulatory and able to carry out work of a light or sedentary nature] : Status 1- Restricted in physically strenuous activity but ambulatory and able to carry out work of a light or sedentary nature, e.g., light house work, office work [Normal Male] : prostate smooth, symmetric with no modularity or induration [Normal] : affect appropriate [de-identified] : soft mass on right thigh overlying the lateral hip, non-tender, no eccyhmosis, 3x4 fbs, no change.

## 2019-02-11 ENCOUNTER — APPOINTMENT (OUTPATIENT)
Dept: ORTHOPEDIC SURGERY | Facility: CLINIC | Age: 79
End: 2019-02-11
Payer: MEDICARE

## 2019-02-11 VITALS
HEART RATE: 72 BPM | HEIGHT: 69 IN | SYSTOLIC BLOOD PRESSURE: 167 MMHG | BODY MASS INDEX: 24.88 KG/M2 | WEIGHT: 168 LBS | DIASTOLIC BLOOD PRESSURE: 77 MMHG

## 2019-02-11 DIAGNOSIS — Z87.09 PERSONAL HISTORY OF OTHER DISEASES OF THE RESPIRATORY SYSTEM: ICD-10-CM

## 2019-02-11 DIAGNOSIS — Z78.9 OTHER SPECIFIED HEALTH STATUS: ICD-10-CM

## 2019-02-11 DIAGNOSIS — Z60.2 PROBLEMS RELATED TO LIVING ALONE: ICD-10-CM

## 2019-02-11 DIAGNOSIS — Z85.118 PERSONAL HISTORY OF OTHER MALIGNANT NEOPLASM OF BRONCHUS AND LUNG: ICD-10-CM

## 2019-02-11 DIAGNOSIS — Z86.39 PERSONAL HISTORY OF OTHER ENDOCRINE, NUTRITIONAL AND METABOLIC DISEASE: ICD-10-CM

## 2019-02-11 PROCEDURE — 99204 OFFICE O/P NEW MOD 45 MIN: CPT

## 2019-02-11 SDOH — SOCIAL STABILITY - SOCIAL INSECURITY: PROBLEMS RELATED TO LIVING ALONE: Z60.2

## 2019-02-12 PROBLEM — Z78.9 CURRENT NON-SMOKER: Status: ACTIVE | Noted: 2019-02-11

## 2019-02-12 PROBLEM — Z87.09 HISTORY OF ASTHMA: Status: RESOLVED | Noted: 2019-02-11 | Resolved: 2019-02-12

## 2019-02-12 PROBLEM — Z78.9 EXERCISES RARELY: Status: ACTIVE | Noted: 2019-02-11

## 2019-02-12 PROBLEM — Z86.39 HISTORY OF HYPERCHOLESTEROLEMIA: Status: RESOLVED | Noted: 2019-02-11 | Resolved: 2019-02-12

## 2019-02-12 PROBLEM — Z78.9 DENIES ALCOHOL CONSUMPTION: Status: ACTIVE | Noted: 2019-02-11

## 2019-02-12 PROBLEM — Z85.118 HISTORY OF MALIGNANT NEOPLASM OF LUNG: Status: RESOLVED | Noted: 2019-02-11 | Resolved: 2019-02-12

## 2019-02-12 NOTE — DATA REVIEWED
[de-identified] : Patient had MRI of the lumbar spine a year ago showing significant L3-4 L4-5 and L5-S1 discs without herniation at L4-5 impinging on the nerve roots RIGHT worse than LEFT. There is also arthritis facet joints. Multiple studies around his pelvis including MRI and CAT scan show large lipomatous lesion off of the iliac wing under the gluteus muscles on the RIGHT side. This is approximately 15 cm.

## 2019-02-12 NOTE — REVIEW OF SYSTEMS
[Chills] : no chills [Feeling Tired] : not feeling tired [Fever] : no fever [Joint Pain] : no joint pain [Nl] : Hematologic/Lymphatic

## 2019-02-12 NOTE — HISTORY OF PRESENT ILLNESS
[FreeTextEntry1] : This is a 78-year-old gentleman comes in complaining of a large mass over his RIGHT hip superiorly. He also is more complaining of bilateral lower back pain with radiculopathy. This goes down his RIGHT leg down the back to the medial and back of his calf. The LEFT side occasionally is in his hip posteriorly and back of his leg. He has never had any weakness or any paresthesias.\par \par This is a history of lung cancer as well as lymphoma as well as localized prostate cancer. He has a tear of the oncologist. Multiple imaging is not of a large lipoma in his gluteal muscles and so a synovectomy for evaluation of this. He says the mass and therefore 5 years. It does not cause any pain. [Stable] : stable [___ yrs] : [unfilled] year(s) ago [4] : currently ~his/her~ pain is 4 out of 10 [Standing] : standing [Bending] : worsened by bending [Direct Pressure] : worsened by direct pressure [Joint Movement] : worsened by joint movement [Walking] : worsened by walking

## 2019-02-12 NOTE — REASON FOR VISIT
[FreeTextEntry1] : Patient bilateral lower back pain with radiculopathy to the RIGHT ethmoid and LEFT side. Also sent for large RIGHT gluteal lipoma

## 2019-02-12 NOTE — DISCUSSION/SUMMARY
[All Questions Answered] : Patient (and family) had all questions answered to an agreeable level of satisfaction [Interested in Proceeding] : Patient (and family) expressed understanding and interest in proceeding with the plan as outlined [de-identified] : Patient lumbar spine issues with radiculopathy as well as a large RIGHT renal mass. He said that the mass is for a long time. It appears to be a lipomatous mass with atypia. I told him that based on size and location he should have it out however his spine problems are more of a problem for him. I have recommended he see a spine service if it might be able to further help him. He has had injections before but has not been seen in our practice and I think this is appropriate. I will see him again when he is ready to take out his lipoma and after he has been evaluated by the spine service.\par \par If imaging was ordered, the patient was told to make an appointment to review findings right after all imaging is completed.\par \par We discussed risks, benefits and alternatives. Rationale of care was reviewed and all questions were answered.

## 2019-02-12 NOTE — PHYSICAL EXAM
[FreeTextEntry1] : On exam, the patient stands and good balance. He has pain when walking more than one or 2 blocks going down his bilateral buttocks into his legs. He has pain with straight leg raise RIGHT worse than LEFT. He has 1 feet of clonus on the RIGHT none on the LEFT. He has a large mass in the RIGHT gluteal area superior and lateral to the hip. It is deep it is not mobile it is rubbery. It is minimally tender. There is no Tinel's, thrills or bruits. [General Appearance - Well-Appearing] : Well appearing [General Appearance - Well Nourished] : well nourished [Oriented To Time, Place, And Person] : Oriented to person, place, and time [Impaired Insight] : Insight and judgment were intact [Affect] : The affect was normal. [Mood] : the mood was normal [Sclera] : the sclera and conjunctiva were normal [Neck Cervical Mass (___cm)] : no neck mass was observed [Heart Rate And Rhythm] : heart rate was normal and rhythm regular [] : No respiratory distress [Abdomen Soft] : Soft [Shuffling] : shuffling [Normal Station and Gait] : gait and station were normal [Tenderness] : tenderness [Masses] : masses [Incision Healed - Describe:] : Incision is healed ~M [Full ROM Unless otherwise noted:] : Full range of motion unless otherwise noted: [LE  Motor Strength Normal unless otherwise noted:] : 5/5 strength in bilateral lower extemities unless otherwise noted. [Normal] : Sensation intact to light touch. [Ankle Clonus (Beats) Right Only] : present on the right [Ankle Clonus (Beats)  Left Only] : absent on the left [2+] : left 2+

## 2019-02-15 ENCOUNTER — APPOINTMENT (OUTPATIENT)
Dept: ORTHOPEDIC SURGERY | Facility: CLINIC | Age: 79
End: 2019-02-15
Payer: MEDICARE

## 2019-02-15 DIAGNOSIS — M43.16 SPONDYLOLISTHESIS, LUMBAR REGION: ICD-10-CM

## 2019-02-15 DIAGNOSIS — M48.07 SPINAL STENOSIS, LUMBOSACRAL REGION: ICD-10-CM

## 2019-02-15 PROCEDURE — 99214 OFFICE O/P EST MOD 30 MIN: CPT

## 2019-02-15 NOTE — DISCUSSION/SUMMARY
[de-identified] : lumbar degenerative disc disease\par discussed options\par Recommending physical therapy and home exercise program with anti-inflammatory medication as needed.\par All options discussed including rest, medicine, home exercise, acupuncture, Chiropractic care, Physical Therapy, Pain management, and last resort surgery.\par All questions were answered, all alternatives discussed and the patient is in complete agreement with that plan. Follow-up appointment as instructed. Any issues and the patient will call or come in sooner.\par

## 2019-02-15 NOTE — PHYSICAL EXAM
[UE/LE] : Sensory: Intact in bilateral upper & lower extremities [ALL] : dorsalis pedis, posterior tibial, femoral, popliteal, and radial 2+ and symmetric bilaterally [Normal] : Oriented to person, place, and time, insight and judgement were intact and the affect was normal [Poor Appearance] : well-appearing [Acute Distress] : not in acute distress [de-identified] : Limited range of motion left hip\par 5 out of 5 motor strength, sensation is intact and symmetrical full range of motion flexion extension and rotation, no palpatory tenderness full range of motion of hips knees shoulders and elbows (all four extremities), no atrophy, negative straight leg raise, no pathological reflexes, no swelling, normal ambulation, no apparent distress skin is intact, no palpable lymph nodes, no upper or lower extremity instability, alert and oriented x3 and normal mood. Normal finger-to nose test.\par  [de-identified] : MRI lumbar-mild stenosis.\par AP/lat lumbar-adequate with minimal L4-5 spondylolisthesis-reviewed with patient. \par

## 2019-02-15 NOTE — ADDENDUM
[FreeTextEntry1] : This note was authored by Kinjal Rincon working as a medical scribe for Dr. Nicholas Harmon. The note was reviewed, edited, and revised by Dr. Nicholas Harmon whom is in agreement with the exam findings, imaging findings, and treatment plan. 02/15/2019.

## 2019-02-15 NOTE — HISTORY OF PRESENT ILLNESS
[Pain Location] : pain [Worsening] : worsening [de-identified] : LBP\par Limited range of motion left hip\par Had trigger point injections 4 times - no help\par Tried PT -  no help\par Referred by Dr. Byron Interiano\par No fever chills sweats nausea vomiting no bowel or bladder dysfunction, no recent weight loss or gain no night pain. This history is in addition to the intake form that I personally reviewed.\par

## 2019-03-04 PROBLEM — R41.3 MEMORY LOSS: Status: ACTIVE | Noted: 2017-02-13

## 2019-03-06 ENCOUNTER — APPOINTMENT (OUTPATIENT)
Dept: UROLOGY | Facility: CLINIC | Age: 79
End: 2019-03-06

## 2019-04-24 ENCOUNTER — APPOINTMENT (OUTPATIENT)
Dept: UROLOGY | Facility: CLINIC | Age: 79
End: 2019-04-24
Payer: MEDICARE

## 2019-04-24 PROCEDURE — 99214 OFFICE O/P EST MOD 30 MIN: CPT | Mod: 25

## 2019-04-24 PROCEDURE — 51798 US URINE CAPACITY MEASURE: CPT

## 2019-04-27 ENCOUNTER — APPOINTMENT (OUTPATIENT)
Dept: RADIOLOGY | Facility: IMAGING CENTER | Age: 79
End: 2019-04-27
Payer: MEDICARE

## 2019-04-27 ENCOUNTER — OUTPATIENT (OUTPATIENT)
Dept: OUTPATIENT SERVICES | Facility: HOSPITAL | Age: 79
LOS: 1 days | End: 2019-04-27
Payer: MEDICARE

## 2019-04-27 DIAGNOSIS — Z00.8 ENCOUNTER FOR OTHER GENERAL EXAMINATION: ICD-10-CM

## 2019-04-27 PROCEDURE — 73502 X-RAY EXAM HIP UNI 2-3 VIEWS: CPT | Mod: 26,LT

## 2019-04-27 PROCEDURE — 73502 X-RAY EXAM HIP UNI 2-3 VIEWS: CPT

## 2019-04-29 LAB
APPEARANCE: CLEAR
BACTERIA: NEGATIVE
BILIRUBIN URINE: NEGATIVE
BLOOD URINE: NEGATIVE
COLOR: YELLOW
GLUCOSE QUALITATIVE U: ABNORMAL
HYALINE CASTS: 2 /LPF
KETONES URINE: NEGATIVE
LEUKOCYTE ESTERASE URINE: NEGATIVE
MICROSCOPIC-UA: NORMAL
NITRITE URINE: NEGATIVE
PH URINE: 5.5
PROTEIN URINE: NORMAL
PSA FREE FLD-MCNC: 19 %
PSA FREE SERPL-MCNC: 0.05 NG/ML
PSA SERPL-MCNC: 0.24 NG/ML
RED BLOOD CELLS URINE: 1 /HPF
SPECIFIC GRAVITY URINE: 1.03
SQUAMOUS EPITHELIAL CELLS: 1 /HPF
TESTOST SERPL-MCNC: 246 NG/DL
UROBILINOGEN URINE: NORMAL
WHITE BLOOD CELLS URINE: 1 /HPF

## 2019-05-15 ENCOUNTER — OUTPATIENT (OUTPATIENT)
Dept: OUTPATIENT SERVICES | Facility: HOSPITAL | Age: 79
LOS: 1 days | Discharge: HOME | End: 2019-05-15

## 2019-05-15 DIAGNOSIS — C85.88 OTHER SPECIFIED TYPES OF NON-HODGKIN LYMPHOMA, LYMPH NODES OF MULTIPLE SITES: ICD-10-CM

## 2019-05-16 NOTE — CONSULT LETTER
[Dear  ___] : Dear  [unfilled], [Please see my note below.] : Please see my note below. [Courtesy Letter:] : I had the pleasure of seeing your patient, [unfilled], in my office today. [Sincerely,] : Sincerely,

## 2019-05-17 ENCOUNTER — RESULT REVIEW (OUTPATIENT)
Age: 79
End: 2019-05-17

## 2019-05-17 ENCOUNTER — APPOINTMENT (OUTPATIENT)
Dept: HEMATOLOGY ONCOLOGY | Facility: CLINIC | Age: 79
End: 2019-05-17
Payer: MEDICARE

## 2019-05-17 VITALS
TEMPERATURE: 97.5 F | SYSTOLIC BLOOD PRESSURE: 138 MMHG | WEIGHT: 170 LBS | DIASTOLIC BLOOD PRESSURE: 78 MMHG | BODY MASS INDEX: 25.1 KG/M2 | HEART RATE: 83 BPM | RESPIRATION RATE: 18 BRPM | OXYGEN SATURATION: 96 %

## 2019-05-17 DIAGNOSIS — M54.41 LUMBAGO WITH SCIATICA, RIGHT SIDE: ICD-10-CM

## 2019-05-17 LAB
ALBUMIN SERPL ELPH-MCNC: 4.5 G/DL
ALP BLD-CCNC: 53 U/L
ALT SERPL-CCNC: 24 U/L
ANION GAP SERPL CALC-SCNC: 17 MMOL/L
AST SERPL-CCNC: 23 U/L
BASOPHILS # BLD AUTO: 0 K/UL — SIGNIFICANT CHANGE UP (ref 0–0.2)
BASOPHILS NFR BLD AUTO: 0.1 % — SIGNIFICANT CHANGE UP (ref 0–2)
BILIRUB SERPL-MCNC: 1 MG/DL
BUN SERPL-MCNC: 18 MG/DL
CALCIUM SERPL-MCNC: 10.4 MG/DL
CHLORIDE SERPL-SCNC: 99 MMOL/L
CO2 SERPL-SCNC: 21 MMOL/L
CREAT SERPL-MCNC: 1.4 MG/DL
EOSINOPHIL # BLD AUTO: 0.3 K/UL — SIGNIFICANT CHANGE UP (ref 0–0.5)
EOSINOPHIL NFR BLD AUTO: 5.7 % — SIGNIFICANT CHANGE UP (ref 0–6)
GLUCOSE SERPL-MCNC: 238 MG/DL
HCT VFR BLD CALC: 42.8 % — SIGNIFICANT CHANGE UP (ref 39–50)
HGB BLD-MCNC: 15.2 G/DL — SIGNIFICANT CHANGE UP (ref 13–17)
LDH SERPL-CCNC: 151 U/L
LYMPHOCYTES # BLD AUTO: 1.1 K/UL — SIGNIFICANT CHANGE UP (ref 1–3.3)
LYMPHOCYTES # BLD AUTO: 18.4 % — SIGNIFICANT CHANGE UP (ref 13–44)
MCHC RBC-ENTMCNC: 32 PG — SIGNIFICANT CHANGE UP (ref 27–34)
MCHC RBC-ENTMCNC: 35.5 G/DL — SIGNIFICANT CHANGE UP (ref 32–36)
MCV RBC AUTO: 90 FL — SIGNIFICANT CHANGE UP (ref 80–100)
MONOCYTES # BLD AUTO: 0.6 K/UL — SIGNIFICANT CHANGE UP (ref 0–0.9)
MONOCYTES NFR BLD AUTO: 9.9 % — SIGNIFICANT CHANGE UP (ref 2–14)
NEUTROPHILS # BLD AUTO: 4 K/UL — SIGNIFICANT CHANGE UP (ref 1.8–7.4)
NEUTROPHILS NFR BLD AUTO: 65.9 % — SIGNIFICANT CHANGE UP (ref 43–77)
PLATELET # BLD AUTO: 94 K/UL — LOW (ref 150–400)
POTASSIUM SERPL-SCNC: 5.4 MMOL/L
PROT SERPL-MCNC: 6.9 G/DL
RBC # BLD: 4.75 M/UL — SIGNIFICANT CHANGE UP (ref 4.2–5.8)
RBC # FLD: 13.2 % — SIGNIFICANT CHANGE UP (ref 10.3–14.5)
SODIUM SERPL-SCNC: 137 MMOL/L
WBC # BLD: 6.1 K/UL — SIGNIFICANT CHANGE UP (ref 3.8–10.5)
WBC # FLD AUTO: 6.1 K/UL — SIGNIFICANT CHANGE UP (ref 3.8–10.5)

## 2019-05-17 PROCEDURE — 99214 OFFICE O/P EST MOD 30 MIN: CPT

## 2019-05-17 NOTE — PHYSICAL EXAM
[Restricted in physically strenuous activity but ambulatory and able to carry out work of a light or sedentary nature] : Status 1- Restricted in physically strenuous activity but ambulatory and able to carry out work of a light or sedentary nature, e.g., light house work, office work [Normal Male] : prostate smooth, symmetric with no modularity or induration [Normal] : affect appropriate [de-identified] : soft mass on right thigh overlying the lateral hip, non-tender, no eccyhmosis, 3x4 fbs, no change.

## 2019-05-17 NOTE — HISTORY OF PRESENT ILLNESS
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 The lung cancer was diagnosed in May 2013, was resected as a T1 lesion and received 4 cycles of cisplatin and etoposide between June and September 2013. 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 Platelet count has been in the 90k/uL range and there is no evidence of bleeding or bruising. 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 I reviewed with radiology -- it showed a 3x2.5 cm mass in 2010 (retrospectively) and now is 6x4 cm. The lesion is homogeneous on scan and is not painful.\par \par I spoke with Dr. Cory cortez 3/24/15 who notes Nan 6 prostate cancer from 11/2014.  He ordered bone scan and will refer to Dr. Cardoza. Patient completed EBRT for 6 weeks in DEC 2015. Has no further diarrhea or blood in stool. Now eating well, weight is stable. \par \par 7/20/16 CT of chest c/w: IMPRESSION:  Emphysema  with  right  lower  lobe  prior  wedge  resection  and resolution  of  the  two  nodules  that  were  new  on  the  prior  study.  Left  lower lobe  4  mm  nodule  stable  from  several  prior  studies.  Continued  follow-up recommended.\par \par 2/10/17- CT of chest 2/4/17 without recurrence of disease. Patient reports he continues with arthralgias. Also continues with intermittent dizziness with position changes. He has not seen his cardiologist yet. Patient reports diarrhea has resolved and has been followed up with GI.\par \par 4/6/2018: Patient complain of bilateral lower lumbar pain and radiation down back of right leg from sciatic notch to lateral and anterior thigh. This makes it difficult to walk. Pain has been present for ~ 1 year but now getting worse. While in bed or sitting in chair, there is no pain. Otherwise he has no other new complaints. He had an MRI at [facility] on 3/2/18 ordered by Dr. Eddie Graves.  Various signal changes, arthritic changes and disk issues are seen. Last MRI was done at [facility] 10/2016. CAT 2/2018 of T/A/P showed stable and no evidence of relapse. Patient states he notices food stuck in throat intermittently over the past year. 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 After some "treatment" this has gotten better. Also he complain of right low back and hip pain. He had an injection to back and hip that did not help with Dr. Gutiérrez [phone]. \par \par 2/8/2019: today patient complain of pain in low back and right hip to anterior thigh. Also notes pain when stands in the left upper anterior thigh when he rises from standing. He saw Dr. Humphrey who is a physiatrist. XRAYs of right hip ordered. This was compared to CAT 11/2018 and showed some chronic changes. Patient advised to see a Neurosurgeon\par \par 5/17/2019: Mr Wan is here for follow up.  Today he is c/o dizziness which started once her got out his car.  After sitting down her feels better.  Mr Wan continues to complain about lower back pain.  He was seen by Dr Interiano (Ortho) who referred the patient to Dr Harmon.  He was seen by Dr Harmon (Spine) on 2/2019.  Patient had trigger point injections 4 times and PT with no help.  He recommended physical therapy and home exercise program with anti-inflammatory medication as needed.\par \par \par  [de-identified] : neuroendocrine, high grade [de-identified] : Petr Humphrey: [phone]

## 2019-05-24 ENCOUNTER — FORM ENCOUNTER (OUTPATIENT)
Age: 79
End: 2019-05-24

## 2019-05-25 ENCOUNTER — OUTPATIENT (OUTPATIENT)
Dept: OUTPATIENT SERVICES | Facility: HOSPITAL | Age: 79
LOS: 1 days | End: 2019-05-25
Payer: MEDICARE

## 2019-05-25 ENCOUNTER — APPOINTMENT (OUTPATIENT)
Dept: CT IMAGING | Facility: IMAGING CENTER | Age: 79
End: 2019-05-25
Payer: MEDICARE

## 2019-05-25 DIAGNOSIS — C85.90 NON-HODGKIN LYMPHOMA, UNSPECIFIED, UNSPECIFIED SITE: ICD-10-CM

## 2019-05-25 PROCEDURE — 71260 CT THORAX DX C+: CPT

## 2019-05-25 PROCEDURE — 74177 CT ABD & PELVIS W/CONTRAST: CPT

## 2019-05-25 PROCEDURE — 74177 CT ABD & PELVIS W/CONTRAST: CPT | Mod: 26

## 2019-05-25 PROCEDURE — 71260 CT THORAX DX C+: CPT | Mod: 26

## 2019-08-05 ENCOUNTER — APPOINTMENT (OUTPATIENT)
Dept: UROLOGY | Facility: CLINIC | Age: 79
End: 2019-08-05
Payer: MEDICARE

## 2019-08-05 DIAGNOSIS — F52.21 MALE ERECTILE DISORDER: ICD-10-CM

## 2019-08-05 DIAGNOSIS — R30.0 DYSURIA: ICD-10-CM

## 2019-08-05 PROCEDURE — 99214 OFFICE O/P EST MOD 30 MIN: CPT | Mod: 25

## 2019-08-05 PROCEDURE — 51798 US URINE CAPACITY MEASURE: CPT

## 2019-08-08 LAB
APPEARANCE: CLEAR
BACTERIA: NEGATIVE
BILIRUBIN URINE: NEGATIVE
BLOOD URINE: NEGATIVE
COLOR: YELLOW
GLUCOSE QUALITATIVE U: NORMAL
HYALINE CASTS: 0 /LPF
KETONES URINE: NEGATIVE
LEUKOCYTE ESTERASE URINE: ABNORMAL
MICROSCOPIC-UA: NORMAL
NITRITE URINE: NEGATIVE
PH URINE: 7
PROTEIN URINE: NORMAL
PSA FREE FLD-MCNC: 19 %
PSA FREE SERPL-MCNC: 0.06 NG/ML
PSA SERPL-MCNC: 0.33 NG/ML
RED BLOOD CELLS URINE: 1 /HPF
SPECIFIC GRAVITY URINE: 1.02
SQUAMOUS EPITHELIAL CELLS: 3 /HPF
TESTOST SERPL-MCNC: 239 NG/DL
URINE COMMENTS: NORMAL
UROBILINOGEN URINE: NORMAL
WHITE BLOOD CELLS URINE: 26 /HPF

## 2019-10-02 PROBLEM — Z60.2 PERSON LIVING ALONE: Status: ACTIVE | Noted: 2019-02-11

## 2019-10-30 ENCOUNTER — APPOINTMENT (OUTPATIENT)
Dept: UROLOGY | Facility: CLINIC | Age: 79
End: 2019-10-30

## 2019-11-14 ENCOUNTER — OUTPATIENT (OUTPATIENT)
Dept: OUTPATIENT SERVICES | Facility: HOSPITAL | Age: 79
LOS: 1 days | Discharge: HOME | End: 2019-11-14

## 2019-11-14 DIAGNOSIS — C85.88 OTHER SPECIFIED TYPES OF NON-HODGKIN LYMPHOMA, LYMPH NODES OF MULTIPLE SITES: ICD-10-CM

## 2019-11-19 ENCOUNTER — RESULT REVIEW (OUTPATIENT)
Age: 79
End: 2019-11-19

## 2019-11-19 ENCOUNTER — APPOINTMENT (OUTPATIENT)
Dept: HEMATOLOGY ONCOLOGY | Facility: CLINIC | Age: 79
End: 2019-11-19
Payer: MEDICARE

## 2019-11-19 VITALS
TEMPERATURE: 98.4 F | OXYGEN SATURATION: 98 % | HEART RATE: 72 BPM | WEIGHT: 170 LBS | RESPIRATION RATE: 17 BRPM | SYSTOLIC BLOOD PRESSURE: 138 MMHG | DIASTOLIC BLOOD PRESSURE: 80 MMHG | BODY MASS INDEX: 25.1 KG/M2

## 2019-11-19 LAB
ALBUMIN SERPL ELPH-MCNC: 4.7 G/DL
ALP BLD-CCNC: 49 U/L
ALT SERPL-CCNC: 10 U/L
ANION GAP SERPL CALC-SCNC: 14 MMOL/L
AST SERPL-CCNC: 12 U/L
B2 MICROGLOB SERPL-MCNC: 4.4 MG/L
BASOPHILS # BLD AUTO: 0 K/UL — SIGNIFICANT CHANGE UP (ref 0–0.2)
BASOPHILS NFR BLD AUTO: 0.2 % — SIGNIFICANT CHANGE UP (ref 0–2)
BILIRUB SERPL-MCNC: 0.6 MG/DL
BUN SERPL-MCNC: 21 MG/DL
CALCIUM SERPL-MCNC: 10.4 MG/DL
CEA SERPL-MCNC: 1.5 NG/ML
CHLORIDE SERPL-SCNC: 100 MMOL/L
CO2 SERPL-SCNC: 23 MMOL/L
CREAT SERPL-MCNC: 1.43 MG/DL
CRP SERPL-MCNC: 0.63 MG/DL
EOSINOPHIL # BLD AUTO: 0.2 K/UL — SIGNIFICANT CHANGE UP (ref 0–0.5)
EOSINOPHIL NFR BLD AUTO: 3.3 % — SIGNIFICANT CHANGE UP (ref 0–6)
GLUCOSE SERPL-MCNC: 164 MG/DL
HCT VFR BLD CALC: 42.9 % — SIGNIFICANT CHANGE UP (ref 39–50)
HGB BLD-MCNC: 15.2 G/DL — SIGNIFICANT CHANGE UP (ref 13–17)
LDH SERPL-CCNC: 126 U/L
LYMPHOCYTES # BLD AUTO: 1.2 K/UL — SIGNIFICANT CHANGE UP (ref 1–3.3)
LYMPHOCYTES # BLD AUTO: 16.9 % — SIGNIFICANT CHANGE UP (ref 13–44)
MCHC RBC-ENTMCNC: 33 PG — SIGNIFICANT CHANGE UP (ref 27–34)
MCHC RBC-ENTMCNC: 35.5 G/DL — SIGNIFICANT CHANGE UP (ref 32–36)
MCV RBC AUTO: 93.2 FL — SIGNIFICANT CHANGE UP (ref 80–100)
MONOCYTES # BLD AUTO: 0.7 K/UL — SIGNIFICANT CHANGE UP (ref 0–0.9)
MONOCYTES NFR BLD AUTO: 10.3 % — SIGNIFICANT CHANGE UP (ref 2–14)
NEUTROPHILS # BLD AUTO: 4.8 K/UL — SIGNIFICANT CHANGE UP (ref 1.8–7.4)
NEUTROPHILS NFR BLD AUTO: 69.4 % — SIGNIFICANT CHANGE UP (ref 43–77)
PLATELET # BLD AUTO: 115 K/UL — LOW (ref 150–400)
POTASSIUM SERPL-SCNC: 5.2 MMOL/L
PROT SERPL-MCNC: 7.1 G/DL
PSA FREE FLD-MCNC: 23 %
PSA FREE SERPL-MCNC: 0.08 NG/ML
PSA SERPL-MCNC: 0.34 NG/ML
RBC # BLD: 4.6 M/UL — SIGNIFICANT CHANGE UP (ref 4.2–5.8)
RBC # FLD: 13.2 % — SIGNIFICANT CHANGE UP (ref 10.3–14.5)
SODIUM SERPL-SCNC: 137 MMOL/L
TSH SERPL-ACNC: 2.45 UIU/ML
WBC # BLD: 7 K/UL — SIGNIFICANT CHANGE UP (ref 3.8–10.5)
WBC # FLD AUTO: 7 K/UL — SIGNIFICANT CHANGE UP (ref 3.8–10.5)

## 2019-11-19 PROCEDURE — 99214 OFFICE O/P EST MOD 30 MIN: CPT

## 2019-11-19 NOTE — PHYSICAL EXAM
[Restricted in physically strenuous activity but ambulatory and able to carry out work of a light or sedentary nature] : Status 1- Restricted in physically strenuous activity but ambulatory and able to carry out work of a light or sedentary nature, e.g., light house work, office work [Normal Male] : prostate smooth, symmetric with no modularity or induration [Normal] : affect appropriate [de-identified] : soft mass on right thigh overlying the lateral hip, non-tender, no eccyhmosis, 3x4 fbs, no change. [de-identified] : small 1-2 fb sucutaneous lesion right cheek

## 2019-11-19 NOTE — HISTORY OF PRESENT ILLNESS
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 The lung cancer was diagnosed in May 2013, was resected as a T1 lesion and received 4 cycles of cisplatin and etoposide between June and September 2013. 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 Platelet count has been in the 90k/uL range and there is no evidence of bleeding or bruising. 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 I reviewed with radiology -- it showed a 3x2.5 cm mass in 2010 (retrospectively) and now is 6x4 cm. The lesion is homogeneous on scan and is not painful.\par \par I spoke with Dr. Cory cortez 3/24/15 who notes Nan 6 prostate cancer from 11/2014.  He ordered bone scan and will refer to Dr. Cardoza. Patient completed EBRT for 6 weeks in DEC 2015. Has no further diarrhea or blood in stool. Now eating well, weight is stable. \par \par 7/20/16 CT of chest c/w: IMPRESSION:  Emphysema  with  right  lower  lobe  prior  wedge  resection  and resolution  of  the  two  nodules  that  were  new  on  the  prior  study.  Left  lower lobe  4  mm  nodule  stable  from  several  prior  studies.  Continued  follow-up recommended.\par \par 2/10/17- CT of chest 2/4/17 without recurrence of disease. Patient reports he continues with arthralgias. Also continues with intermittent dizziness with position changes. He has not seen his cardiologist yet. Patient reports diarrhea has resolved and has been followed up with GI.\par \par 4/6/2018: Patient complain of bilateral lower lumbar pain and radiation down back of right leg from sciatic notch to lateral and anterior thigh. This makes it difficult to walk. Pain has been present for ~ 1 year but now getting worse. While in bed or sitting in chair, there is no pain. Otherwise he has no other new complaints. He had an MRI at [facility] on 3/2/18 ordered by Dr. Eddie Graves.  Various signal changes, arthritic changes and disk issues are seen. Last MRI was done at [facility] 10/2016. CAT 2/2018 of T/A/P showed stable and no evidence of relapse. Patient states he notices food stuck in throat intermittently over the past year. 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 After some "treatment" this has gotten better. Also he complain of right low back and hip pain. He had an injection to back and hip that did not help with Dr. Gutiérrez [phone]. \par \par 2/8/2019: today patient complain of pain in low back and right hip to anterior thigh. Also notes pain when stands in the left upper anterior thigh when he rises from standing. He saw Dr. Humphrey who is a physiatrist. XRAYs of right hip ordered. This was compared to CAT 11/2018 and showed some chronic changes. Patient advised to see a Neurosurgeon\par \par 5/17/2019: Mr Wan is here for follow up.  Today he is c/o dizziness which started once her got out his car.  After sitting down her feels better.  Mr Wan continues to complain about lower back pain.  He was seen by Dr Interiano (Ortho) who referred the patient to Dr Harmon.  He was seen by Dr Harmon (Spine) on 2/2019.  Patient had trigger point injections 4 times and PT with no help.  He recommended physical therapy and home exercise program with anti-inflammatory medication as needed.\par \par 11/19/2019: \par Low back pain - has been undergoing injections again with Dr. Allison in [place]. He states no benefit yet.\par Prostate cancer - [place] 6 prostate cancer from 11/2014. s/p external bean radiation. PSA stable. Follows with Dr. Ray. [de-identified] : neuroendocrine, high grade [de-identified] : Petr Humphrey: [phone]

## 2019-11-30 ENCOUNTER — APPOINTMENT (OUTPATIENT)
Dept: ULTRASOUND IMAGING | Facility: IMAGING CENTER | Age: 79
End: 2019-11-30

## 2019-11-30 ENCOUNTER — APPOINTMENT (OUTPATIENT)
Dept: CT IMAGING | Facility: IMAGING CENTER | Age: 79
End: 2019-11-30

## 2019-12-13 ENCOUNTER — FORM ENCOUNTER (OUTPATIENT)
Age: 79
End: 2019-12-13

## 2019-12-14 ENCOUNTER — APPOINTMENT (OUTPATIENT)
Dept: ULTRASOUND IMAGING | Facility: IMAGING CENTER | Age: 79
End: 2019-12-14
Payer: MEDICARE

## 2019-12-14 ENCOUNTER — OUTPATIENT (OUTPATIENT)
Dept: OUTPATIENT SERVICES | Facility: HOSPITAL | Age: 79
LOS: 1 days | End: 2019-12-14
Payer: MEDICARE

## 2019-12-14 ENCOUNTER — APPOINTMENT (OUTPATIENT)
Dept: CT IMAGING | Facility: IMAGING CENTER | Age: 79
End: 2019-12-14
Payer: MEDICARE

## 2019-12-14 DIAGNOSIS — C7A.8 OTHER MALIGNANT NEUROENDOCRINE TUMORS: ICD-10-CM

## 2019-12-14 PROCEDURE — 71260 CT THORAX DX C+: CPT | Mod: 26

## 2019-12-14 PROCEDURE — 74177 CT ABD & PELVIS W/CONTRAST: CPT | Mod: 26

## 2019-12-14 PROCEDURE — 71260 CT THORAX DX C+: CPT

## 2019-12-14 PROCEDURE — 76536 US EXAM OF HEAD AND NECK: CPT

## 2019-12-14 PROCEDURE — 76536 US EXAM OF HEAD AND NECK: CPT | Mod: 26

## 2019-12-14 PROCEDURE — 74177 CT ABD & PELVIS W/CONTRAST: CPT

## 2019-12-26 ENCOUNTER — APPOINTMENT (OUTPATIENT)
Dept: ULTRASOUND IMAGING | Facility: IMAGING CENTER | Age: 79
End: 2019-12-26

## 2020-02-05 ENCOUNTER — APPOINTMENT (OUTPATIENT)
Dept: UROLOGY | Facility: CLINIC | Age: 80
End: 2020-02-05
Payer: MEDICARE

## 2020-02-05 DIAGNOSIS — R97.20 ELEVATED PROSTATE, SPECIFIC ANTIGEN [PSA]: ICD-10-CM

## 2020-02-05 DIAGNOSIS — N13.8 BENIGN PROSTATIC HYPERPLASIA WITH LOWER URINARY TRACT SYMPMS: ICD-10-CM

## 2020-02-05 DIAGNOSIS — N40.1 BENIGN PROSTATIC HYPERPLASIA WITH LOWER URINARY TRACT SYMPMS: ICD-10-CM

## 2020-02-05 PROCEDURE — 99214 OFFICE O/P EST MOD 30 MIN: CPT

## 2020-02-11 LAB
APPEARANCE: CLEAR
BACTERIA: NEGATIVE
BILIRUBIN URINE: NEGATIVE
BLOOD URINE: NEGATIVE
COLOR: YELLOW
GLUCOSE QUALITATIVE U: NEGATIVE
HYALINE CASTS: 0 /LPF
KETONES URINE: NEGATIVE
LEUKOCYTE ESTERASE URINE: ABNORMAL
MICROSCOPIC-UA: NORMAL
NITRITE URINE: NEGATIVE
PH URINE: 5.5
PROTEIN URINE: ABNORMAL
PSA FREE FLD-MCNC: 25 %
PSA FREE SERPL-MCNC: 0.07 NG/ML
PSA SERPL-MCNC: 0.27 NG/ML
RED BLOOD CELLS URINE: 6 /HPF
SPECIFIC GRAVITY URINE: 1.03
SQUAMOUS EPITHELIAL CELLS: 1 /HPF
UROBILINOGEN URINE: NORMAL
WHITE BLOOD CELLS URINE: 98 /HPF

## 2020-08-05 ENCOUNTER — APPOINTMENT (OUTPATIENT)
Dept: UROLOGY | Facility: CLINIC | Age: 80
End: 2020-08-05

## 2020-08-19 ENCOUNTER — APPOINTMENT (OUTPATIENT)
Dept: UROLOGY | Facility: CLINIC | Age: 80
End: 2020-08-19
Payer: MEDICARE

## 2020-08-19 VITALS
HEIGHT: 69 IN | SYSTOLIC BLOOD PRESSURE: 130 MMHG | TEMPERATURE: 97.2 F | OXYGEN SATURATION: 95 % | HEART RATE: 82 BPM | BODY MASS INDEX: 23.7 KG/M2 | DIASTOLIC BLOOD PRESSURE: 81 MMHG | WEIGHT: 160 LBS

## 2020-08-19 PROCEDURE — 99214 OFFICE O/P EST MOD 30 MIN: CPT

## 2020-08-24 LAB
PSA FREE FLD-MCNC: 19 %
PSA FREE SERPL-MCNC: 0.09 NG/ML
PSA SERPL-MCNC: 0.45 NG/ML

## 2020-08-26 ENCOUNTER — APPOINTMENT (OUTPATIENT)
Dept: CARDIOLOGY | Facility: CLINIC | Age: 80
End: 2020-08-26
Payer: MEDICARE

## 2020-08-26 ENCOUNTER — NON-APPOINTMENT (OUTPATIENT)
Age: 80
End: 2020-08-26

## 2020-08-26 VITALS
TEMPERATURE: 98.5 F | HEIGHT: 69 IN | SYSTOLIC BLOOD PRESSURE: 166 MMHG | RESPIRATION RATE: 18 BRPM | WEIGHT: 170 LBS | DIASTOLIC BLOOD PRESSURE: 100 MMHG | OXYGEN SATURATION: 96 % | HEART RATE: 86 BPM | BODY MASS INDEX: 25.18 KG/M2

## 2020-08-26 VITALS — DIASTOLIC BLOOD PRESSURE: 85 MMHG | SYSTOLIC BLOOD PRESSURE: 178 MMHG

## 2020-08-26 VITALS — DIASTOLIC BLOOD PRESSURE: 82 MMHG | SYSTOLIC BLOOD PRESSURE: 160 MMHG

## 2020-08-26 VITALS — SYSTOLIC BLOOD PRESSURE: 178 MMHG | DIASTOLIC BLOOD PRESSURE: 92 MMHG

## 2020-08-26 DIAGNOSIS — R06.00 DYSPNEA, UNSPECIFIED: ICD-10-CM

## 2020-08-26 PROCEDURE — 93000 ELECTROCARDIOGRAM COMPLETE: CPT

## 2020-08-26 PROCEDURE — 99204 OFFICE O/P NEW MOD 45 MIN: CPT

## 2020-08-29 PROBLEM — R06.00 DYSPNEA: Status: ACTIVE | Noted: 2020-08-29

## 2020-08-29 NOTE — REVIEW OF SYSTEMS
[Dyspnea on exertion] : dyspnea during exertion [Joint Pain] : joint pain [Negative] : Heme/Lymph [Fever] : no fever [Headache] : no headache [Recent Weight Gain (___ Lbs)] : no recent weight gain [Chills] : no chills [Feeling Fatigued] : not feeling fatigued [Recent Weight Loss (___ Lbs)] : no recent weight loss [Shortness Of Breath] : no shortness of breath [Chest  Pressure] : no chest pressure [Chest Pain] : no chest pain [Lower Ext Edema] : no extremity edema [Palpitations] : no palpitations [Cough] : no cough [Dizziness] : no dizziness [Easy Bleeding] : no tendency for easy bleeding [Easy Bruising] : no tendency for easy bruising

## 2020-08-29 NOTE — PHYSICAL EXAM
[General Appearance - Well Developed] : well developed [Normal Appearance] : normal appearance [Well Groomed] : well groomed [General Appearance - Well Nourished] : well nourished [No Deformities] : no deformities [Normal Conjunctiva] : the conjunctiva exhibited no abnormalities [General Appearance - In No Acute Distress] : no acute distress [Eyelids - No Xanthelasma] : the eyelids demonstrated no xanthelasmas [No Oral Pallor] : no oral pallor [Normal Oral Mucosa] : normal oral mucosa [Normal Jugular Venous A Waves Present] : normal jugular venous A waves present [No Oral Cyanosis] : no oral cyanosis [Normal Jugular Venous V Waves Present] : normal jugular venous V waves present [No Jugular Venous Hebert A Waves] : no jugular venous hebert A waves [Normal Rate] : normal [Heart Rate ___] : [unfilled] bpm [Rhythm Regular] : regular [Normal S1] : normal S1 [Normal S2] : normal S2 [No Gallop] : no gallop heard [No Murmur] : no murmurs heard [2+] : left 2+ [No Pitting Edema] : no pitting edema present [No Abnormalities] : the abdominal aorta was not enlarged and no bruit was heard [Respiration, Rhythm And Depth] : normal respiratory rhythm and effort [Auscultation Breath Sounds / Voice Sounds] : lungs were clear to auscultation bilaterally [Exaggerated Use Of Accessory Muscles For Inspiration] : no accessory muscle use [Bowel Sounds] : normal bowel sounds [Abdomen Soft] : soft [Abdomen Tenderness] : non-tender [Abnormal Walk] : normal gait [Nail Clubbing] : no clubbing of the fingernails [Cyanosis, Localized] : no localized cyanosis [Petechial Hemorrhages (___cm)] : no petechial hemorrhages [Skin Color & Pigmentation] : normal skin color and pigmentation [] : no rash [Skin Turgor] : normal skin turgor [No Venous Stasis] : no venous stasis [No Xanthoma] : no  xanthoma was observed [Impaired Insight] : insight and judgment were intact [Oriented To Time, Place, And Person] : oriented to person, place, and time [Affect] : the affect was normal [Mood] : the mood was normal [Memory Recent] : recent memory was not impaired [Memory Remote] : remote memory was not impaired [No Anxiety] : not feeling anxious [Click] : no click [Distant] : the heart sounds were ~L not distant [Pericardial Rub] : no pericardial rub [Left Carotid Bruit] : no bruit heard over the left carotid [Right Carotid Bruit] : no bruit heard over the right carotid [Bruit] : no bruit heard [Rt] : no varicose veins of the right leg [Lt] : no varicose veins of the left leg [FreeTextEntry1] : ambulates with the use of a cane

## 2020-08-29 NOTE — DISCUSSION/SUMMARY
[FreeTextEntry1] : The patient presents for cardiac clearance prior to endoscopy and colonoscopy.  He reports dyspnea upon exertion when he ambulates with his cane.  He does not over exert himself and denies any chest pain/tightness/discomfort or palpitations.  He blood pressure is elevated (blood pressure in both arms were checked at time of examination and are within 10mmHg).  He reports his blood pressure ranging between 140-180mmHg.  Denies any changes in his symptoms when his blood pressure is elevated.\par \par 1) Monitor BP daily and write in diary - bring to next appt.  Instructions on how he should check his blood pressure were reviewed.  The importance of taking his medications was reinforced.  \par 2) Take medication as prescribed at same time each day.  The patient is taking is medications at unscheduled times in the day.  He was instructed to take his medications as prescribed.  \par 3) Bring medication list and/or all bottles to next appt.  It is not sure what medications he is taking.  The patient was asked at time of next visit to bring a medication list.  He reports that his internist prescribes all his medications.\par 4) TTE - to be done 9/3/2020 @ 1:00pm.  Prior to clearnace it is recommended that a TTE be performed.  Consideration could be made for a stress test.  It is unclear if the patient is having worsening symptoms that may be his anginal equivalent due to his reduced functional capacity, POBA of pRCA ISRand uncontrolled hypertension.\par 5) F/u in office after TTE with BP diary and medication bottles and/or list after TTE done (week of 9/7/2020)\par 6) Not cleared to have upper endoscopy and colonoscopy right now.  Sharon at Dr. Estevez office made aware.  ([phone] (Sharon back line) OFFICE: [phone], FAX: [phone] )\par \par All questions and concerns of the patient were addressed.

## 2020-08-29 NOTE — REASON FOR VISIT
[Initial Evaluation] : an initial evaluation of [Hypertension] : hypertension [Medication Management] : Medication management [FreeTextEntry2] : Pre-procedure cardiac clearance

## 2020-08-29 NOTE — HISTORY OF PRESENT ILLNESS
Reviewed notes from Dr. Ed Disla dated 2/13 indicating AGE. We spoke by phone, sharing my assessment of serotonin syndrome with interventions to date and plan.    ERIKA
[FreeTextEntry1] : KATIUSKA GIRON is a 80 year old male here for cardiac evaluation prior to EGD and colonoscopy scheduled on Friday 8/28/2020 to be done by Dr. Estevez.  He has a past cardiac history of HTN, HLD,  NSTEMI with placement of  bare metal stent to [facility] on 9/30/2009 by Dr. Zheng Garza followed by POBA to [facility] on 2/24/2010 after abnormal stress test. He also had a vascular procedure of the RLE in 2011 (possible stenting) due to claudication.  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 \par \par At his last office visit with Dr. Garza, he was doing well with no cardiac issues and was to undergo repair of right retinal detachment. This was done without problem off Clopidogrel.\par \par He called the office this morning requesting an appointment for clearance prior to upper endoscopy and colonoscopy with possible biopsy which is scheduled for 8/28/2020.  Since he was here last, he reports feeling "good" overall. He currently denies fever, chills, palpitations, angina, dyspnea, lightheadedness, syncope, or peripheral edema. His energy level is good but does take a nap sometimes during the day if watching TV.  His appetite is "perfecto". Denies bladder problems but does have constipation which is why the colonoscopy is being done per his report.  He does get dizzy at times and uses a walker or cane to ambulate.  No falls. He exercises by riding his stationary bike for 15-20 minutes 2 or 3 times a week. He feels a little short of breath when riding his breath. He states his BP is "sometimes up, sometimes down".  He takes his BP at home and gets about "155-160" and when he exercises, it will go up to 200 but down to 140-150 after done exercising.  He does not take his medications as prescribed.  \par \par No new hospitalizations, emergency room/urgent care visits, new medical diagnoses, surgery, or cardiac testing since his last office visit. Had recent blood work done and states "she says it's OK".  He does not have the reports with him today.  He forgot to take his medications today. He was told that he did not need to stop the Plavix but will not take the Metformin the day of the procedure. \par \par LIPIDS - ?\par 11/19/2019: H&H – 15.2/42.9, PLT – 115, Na – 137, K – 5.2, BUN/Creat – 21/1.43, AST/ALT – 12/10, TSH – 2.45\par PCP: Dr. Kerry Tapia\par GI: Dr. Stefan Estevez ([phone], fax: [phone] )\par HEME/ONC - Dr. Chip Carter\par ORTHO SPINE - Dr. Nicholas Harmon\par

## 2020-09-03 ENCOUNTER — OUTPATIENT (OUTPATIENT)
Dept: OUTPATIENT SERVICES | Facility: HOSPITAL | Age: 80
LOS: 1 days | End: 2020-09-03
Payer: MEDICARE

## 2020-09-03 DIAGNOSIS — I35.0 NONRHEUMATIC AORTIC (VALVE) STENOSIS: ICD-10-CM

## 2020-09-03 PROCEDURE — 93306 TTE W/DOPPLER COMPLETE: CPT

## 2020-09-03 PROCEDURE — 93306 TTE W/DOPPLER COMPLETE: CPT | Mod: 26

## 2020-09-30 ENCOUNTER — OUTPATIENT (OUTPATIENT)
Dept: OUTPATIENT SERVICES | Facility: HOSPITAL | Age: 80
LOS: 1 days | Discharge: HOME | End: 2020-09-30

## 2020-09-30 DIAGNOSIS — C85.88 OTHER SPECIFIED TYPES OF NON-HODGKIN LYMPHOMA, LYMPH NODES OF MULTIPLE SITES: ICD-10-CM

## 2020-10-02 ENCOUNTER — RESULT REVIEW (OUTPATIENT)
Age: 80
End: 2020-10-02

## 2020-10-02 ENCOUNTER — APPOINTMENT (OUTPATIENT)
Dept: HEMATOLOGY ONCOLOGY | Facility: CLINIC | Age: 80
End: 2020-10-02
Payer: MEDICARE

## 2020-10-02 VITALS
SYSTOLIC BLOOD PRESSURE: 179 MMHG | DIASTOLIC BLOOD PRESSURE: 74 MMHG | TEMPERATURE: 97.9 F | HEART RATE: 78 BPM | OXYGEN SATURATION: 97 % | WEIGHT: 174.14 LBS | RESPIRATION RATE: 18 BRPM | BODY MASS INDEX: 25.79 KG/M2 | HEIGHT: 69.02 IN

## 2020-10-02 LAB
BASOPHILS # BLD AUTO: 0.04 K/UL — SIGNIFICANT CHANGE UP (ref 0–0.2)
BASOPHILS NFR BLD AUTO: 0.6 % — SIGNIFICANT CHANGE UP (ref 0–2)
EOSINOPHIL # BLD AUTO: 0.27 K/UL — SIGNIFICANT CHANGE UP (ref 0–0.5)
EOSINOPHIL NFR BLD AUTO: 4.3 % — SIGNIFICANT CHANGE UP (ref 0–6)
HCT VFR BLD CALC: 42.3 % — SIGNIFICANT CHANGE UP (ref 39–50)
HGB BLD-MCNC: 14.5 G/DL — SIGNIFICANT CHANGE UP (ref 13–17)
IMM GRANULOCYTES NFR BLD AUTO: 0.6 % — SIGNIFICANT CHANGE UP (ref 0–1.5)
LYMPHOCYTES # BLD AUTO: 1.04 K/UL — SIGNIFICANT CHANGE UP (ref 1–3.3)
LYMPHOCYTES # BLD AUTO: 16.7 % — SIGNIFICANT CHANGE UP (ref 13–44)
MCHC RBC-ENTMCNC: 30.9 PG — SIGNIFICANT CHANGE UP (ref 27–34)
MCHC RBC-ENTMCNC: 34.3 G/DL — SIGNIFICANT CHANGE UP (ref 32–36)
MCV RBC AUTO: 90 FL — SIGNIFICANT CHANGE UP (ref 80–100)
MONOCYTES # BLD AUTO: 0.62 K/UL — SIGNIFICANT CHANGE UP (ref 0–0.9)
MONOCYTES NFR BLD AUTO: 10 % — SIGNIFICANT CHANGE UP (ref 2–14)
NEUTROPHILS # BLD AUTO: 4.21 K/UL — SIGNIFICANT CHANGE UP (ref 1.8–7.4)
NEUTROPHILS NFR BLD AUTO: 67.8 % — SIGNIFICANT CHANGE UP (ref 43–77)
NRBC # BLD: 0 /100 WBCS — SIGNIFICANT CHANGE UP (ref 0–0)
PLATELET # BLD AUTO: 118 K/UL — LOW (ref 150–400)
RBC # BLD: 4.7 M/UL — SIGNIFICANT CHANGE UP (ref 4.2–5.8)
RBC # FLD: 13.9 % — SIGNIFICANT CHANGE UP (ref 10.3–14.5)
WBC # BLD: 6.22 K/UL — SIGNIFICANT CHANGE UP (ref 3.8–10.5)
WBC # FLD AUTO: 6.22 K/UL — SIGNIFICANT CHANGE UP (ref 3.8–10.5)

## 2020-10-02 PROCEDURE — 99214 OFFICE O/P EST MOD 30 MIN: CPT

## 2020-10-02 NOTE — PHYSICAL EXAM
[Restricted in physically strenuous activity but ambulatory and able to carry out work of a light or sedentary nature] : Status 1- Restricted in physically strenuous activity but ambulatory and able to carry out work of a light or sedentary nature, e.g., light house work, office work [Normal Male] : prostate smooth, symmetric with no modularity or induration [Normal] : affect appropriate [de-identified] : soft mass on right thigh overlying the lateral hip, non-tender, no eccyhmosis, 3x4 fbs, no change. [de-identified] : small 1-2 fb sucutaneous lesion right cheek

## 2020-10-02 NOTE — HISTORY OF PRESENT ILLNESS
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 The lung cancer was diagnosed in May 2013, was resected as a T1 lesion and received 4 cycles of cisplatin and etoposide between June and September 2013. 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 Platelet count has been in the 90k/uL range and there is no evidence of bleeding or bruising. 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 I reviewed with radiology -- it showed a 3x2.5 cm mass in 2010 (retrospectively) and now is 6x4 cm. The lesion is homogeneous on scan and is not painful.\par \par I spoke with Dr. Cory cortez 3/24/15 who notes Nan 6 prostate cancer from 11/2014.  He ordered bone scan and will refer to Dr. Cardoza. Patient completed EBRT for 6 weeks in DEC 2015. Has no further diarrhea or blood in stool. Now eating well, weight is stable. \par \par 7/20/16 CT of chest c/w: IMPRESSION:  Emphysema  with  right  lower  lobe  prior  wedge  resection  and resolution  of  the  two  nodules  that  were  new  on  the  prior  study.  Left  lower lobe  4  mm  nodule  stable  from  several  prior  studies.  Continued  follow-up recommended.\par \par 2/10/17- CT of chest 2/4/17 without recurrence of disease. Patient reports he continues with arthralgias. Also continues with intermittent dizziness with position changes. He has not seen his cardiologist yet. Patient reports diarrhea has resolved and has been followed up with GI.\par \par 4/6/2018: Patient complain of bilateral lower lumbar pain and radiation down back of right leg from sciatic notch to lateral and anterior thigh. This makes it difficult to walk. Pain has been present for ~ 1 year but now getting worse. While in bed or sitting in chair, there is no pain. Otherwise he has no other new complaints. He had an MRI at [facility] on 3/2/18 ordered by Dr. Eddie Graves.  Various signal changes, arthritic changes and disk issues are seen. Last MRI was done at [facility] 10/2016. CAT 2/2018 of T/A/P showed stable and no evidence of relapse. Patient states he notices food stuck in throat intermittently over the past year. 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 After some "treatment" this has gotten better. Also he complain of right low back and hip pain. He had an injection to back and hip that did not help with Dr. Gutiérrez [phone]. \par \par 2/8/2019: today patient complain of pain in low back and right hip to anterior thigh. Also notes pain when stands in the left upper anterior thigh when he rises from standing. He saw Dr. Humphrey who is a physiatrist. XRAYs of right hip ordered. This was compared to CAT 11/2018 and showed some chronic changes. Patient advised to see a Neurosurgeon\par \par 5/17/2019: Mr Wan is here for follow up.  Today he is c/o dizziness which started once her got out his car.  After sitting down her feels better.  Mr Wan continues to complain about lower back pain.  He was seen by Dr Interiano (Ortho) who referred the patient to Dr Harmon.  He was seen by Dr Harmon (Spine) on 2/2019.  Patient had trigger point injections 4 times and PT with no help.  He recommended physical therapy and home exercise program with anti-inflammatory medication as needed.\par \par 11/19/2019: \par Low back pain - has been undergoing injections again with Dr. Allison in [place]. He states no benefit yet.\par Prostate cancer - [place] 6 prostate cancer from 11/2014. s/p external bean radiation. PSA stable. Follows with Dr. Ray.\par \par 10/2/2020: Patient is here for an annual follow up.\par 1) Lower back pain:  Patient continues to complain of lower back.  It is worse during ambulation and better when sitting down.  He is using an "opioid" however does not remember which medication. \par 2) Neuroendocrine: CAT chest, abdomen/pelvis on 12/2019 demonstrate no evidence of recurrence.\par 3) Lymphoma: Denies fever, chills, adenopathy, change in weight or appetite.\par 4) Constipation: He is scheduled for colonoscopy/endoscopy with  Dr. Stefan Estevez (GI) in mid October 2020.\par 5) Social: He lives at home alone.  He is able to perform his routine/instrumental ADLs without restriction. [de-identified] : neuroendocrine, high grade [de-identified] : Petr Humphrey: [phone]\par Stefan Estevez, ()

## 2020-10-07 LAB
ALBUMIN SERPL ELPH-MCNC: 4.9 G/DL
ALP BLD-CCNC: 59 U/L
ALT SERPL-CCNC: 14 U/L
ANION GAP SERPL CALC-SCNC: 14 MMOL/L
AST SERPL-CCNC: 16 U/L
B2 MICROGLOB SERPL-MCNC: 4.2 MG/L
BILIRUB SERPL-MCNC: 1 MG/DL
BUN SERPL-MCNC: 14 MG/DL
CALCIUM SERPL-MCNC: 10 MG/DL
CEA SERPL-MCNC: 1.8 NG/ML
CHLORIDE SERPL-SCNC: 103 MMOL/L
CO2 SERPL-SCNC: 22 MMOL/L
CREAT SERPL-MCNC: 1.44 MG/DL
CRP SERPL-MCNC: 0.44 MG/DL
GLUCOSE SERPL-MCNC: 207 MG/DL
LDH SERPL-CCNC: 150 U/L
POTASSIUM SERPL-SCNC: 5.6 MMOL/L
PROT SERPL-MCNC: 7.1 G/DL
PSA FREE FLD-MCNC: 19 %
PSA FREE SERPL-MCNC: 0.1 NG/ML
PSA SERPL-MCNC: 0.51 NG/ML
SODIUM SERPL-SCNC: 138 MMOL/L

## 2020-10-24 ENCOUNTER — APPOINTMENT (OUTPATIENT)
Dept: CT IMAGING | Facility: IMAGING CENTER | Age: 80
End: 2020-10-24
Payer: MEDICARE

## 2020-10-24 ENCOUNTER — OUTPATIENT (OUTPATIENT)
Dept: OUTPATIENT SERVICES | Facility: HOSPITAL | Age: 80
LOS: 1 days | End: 2020-10-24
Payer: MEDICARE

## 2020-10-24 DIAGNOSIS — C85.10 UNSPECIFIED B-CELL LYMPHOMA, UNSPECIFIED SITE: ICD-10-CM

## 2020-10-24 PROCEDURE — 74177 CT ABD & PELVIS W/CONTRAST: CPT | Mod: 26

## 2020-10-24 PROCEDURE — 71260 CT THORAX DX C+: CPT | Mod: 26

## 2020-10-24 PROCEDURE — 74177 CT ABD & PELVIS W/CONTRAST: CPT

## 2020-10-24 PROCEDURE — 71260 CT THORAX DX C+: CPT

## 2020-10-28 ENCOUNTER — APPOINTMENT (OUTPATIENT)
Dept: HEMATOLOGY ONCOLOGY | Facility: CLINIC | Age: 80
End: 2020-10-28
Payer: MEDICARE

## 2020-10-28 ENCOUNTER — NON-APPOINTMENT (OUTPATIENT)
Age: 80
End: 2020-10-28

## 2020-10-28 PROCEDURE — 99441: CPT | Mod: 95

## 2020-12-15 PROBLEM — N39.0 ACUTE UTI: Status: RESOLVED | Noted: 2017-03-31 | Resolved: 2020-12-15

## 2021-02-28 ENCOUNTER — RESULT REVIEW (OUTPATIENT)
Age: 81
End: 2021-02-28

## 2021-03-01 ENCOUNTER — APPOINTMENT (OUTPATIENT)
Dept: UROLOGY | Facility: CLINIC | Age: 81
End: 2021-03-01
Payer: MEDICARE

## 2021-03-01 VITALS
WEIGHT: 172 LBS | HEIGHT: 69 IN | RESPIRATION RATE: 16 BRPM | DIASTOLIC BLOOD PRESSURE: 73 MMHG | HEART RATE: 75 BPM | BODY MASS INDEX: 25.48 KG/M2 | OXYGEN SATURATION: 97 % | SYSTOLIC BLOOD PRESSURE: 146 MMHG | TEMPERATURE: 98 F

## 2021-03-01 DIAGNOSIS — R35.1 NOCTURIA: ICD-10-CM

## 2021-03-01 PROCEDURE — 99213 OFFICE O/P EST LOW 20 MIN: CPT

## 2021-03-03 LAB
APPEARANCE: CLEAR
BACTERIA: NEGATIVE
BILIRUBIN URINE: NEGATIVE
BLOOD URINE: NEGATIVE
COLOR: COLORLESS
GLUCOSE QUALITATIVE U: NEGATIVE
HYALINE CASTS: 0 /LPF
KETONES URINE: NEGATIVE
LEUKOCYTE ESTERASE URINE: NEGATIVE
MICROSCOPIC-UA: NORMAL
NITRITE URINE: NEGATIVE
PH URINE: 6
PROTEIN URINE: NEGATIVE
PSA SERPL-MCNC: 0.39 NG/ML
RED BLOOD CELLS URINE: 0 /HPF
SPECIFIC GRAVITY URINE: 1.01
SQUAMOUS EPITHELIAL CELLS: 1 /HPF
UROBILINOGEN URINE: NORMAL
WHITE BLOOD CELLS URINE: 5 /HPF

## 2021-03-06 ENCOUNTER — APPOINTMENT (OUTPATIENT)
Dept: CT IMAGING | Facility: IMAGING CENTER | Age: 81
End: 2021-03-06

## 2021-03-13 ENCOUNTER — APPOINTMENT (OUTPATIENT)
Dept: CT IMAGING | Facility: IMAGING CENTER | Age: 81
End: 2021-03-13
Payer: MEDICARE

## 2021-03-13 ENCOUNTER — OUTPATIENT (OUTPATIENT)
Dept: OUTPATIENT SERVICES | Facility: HOSPITAL | Age: 81
LOS: 1 days | End: 2021-03-13
Payer: MEDICARE

## 2021-03-13 DIAGNOSIS — C85.10 UNSPECIFIED B-CELL LYMPHOMA, UNSPECIFIED SITE: ICD-10-CM

## 2021-03-13 PROCEDURE — G1004: CPT

## 2021-03-13 PROCEDURE — 71260 CT THORAX DX C+: CPT

## 2021-03-13 PROCEDURE — 71260 CT THORAX DX C+: CPT | Mod: 26,MH

## 2021-03-13 PROCEDURE — 74177 CT ABD & PELVIS W/CONTRAST: CPT

## 2021-03-13 PROCEDURE — 74177 CT ABD & PELVIS W/CONTRAST: CPT | Mod: 26,MH

## 2021-03-13 PROCEDURE — 82565 ASSAY OF CREATININE: CPT

## 2021-03-17 ENCOUNTER — NON-APPOINTMENT (OUTPATIENT)
Age: 81
End: 2021-03-17

## 2021-03-17 ENCOUNTER — APPOINTMENT (OUTPATIENT)
Dept: HEMATOLOGY ONCOLOGY | Facility: CLINIC | Age: 81
End: 2021-03-17
Payer: MEDICARE

## 2021-03-17 PROCEDURE — 99441: CPT | Mod: 95

## 2021-04-01 ENCOUNTER — APPOINTMENT (OUTPATIENT)
Dept: ORTHOPEDIC SURGERY | Facility: CLINIC | Age: 81
End: 2021-04-01
Payer: MEDICARE

## 2021-04-01 VITALS
SYSTOLIC BLOOD PRESSURE: 167 MMHG | OXYGEN SATURATION: 97 % | DIASTOLIC BLOOD PRESSURE: 77 MMHG | HEART RATE: 76 BPM | WEIGHT: 172 LBS | HEIGHT: 68 IN | BODY MASS INDEX: 26.07 KG/M2

## 2021-04-01 DIAGNOSIS — D17.20 BENIGN LIPOMATOUS NEOPLASM OF SKIN AND SUBCUTANEOUS TISSUE OF UNSPECIFIED LIMB: ICD-10-CM

## 2021-04-01 PROCEDURE — 99213 OFFICE O/P EST LOW 20 MIN: CPT

## 2021-04-02 NOTE — PHYSICAL EXAM
[FreeTextEntry1] : On exam patient has a large mass in the anterolateral portion of his gluteal muscles and to fascia tramaine right above the hip.  It is about the same size as before.  It is mobile.  He also has 1 seen on CAT scan right subscap however palpable not painful.  He is neurovascularly intact otherwise.  He is able to move his hip without any pain. [General Appearance - Well-Appearing] : Well appearing [General Appearance - Well Nourished] : well nourished [Shuffling] : shuffling [Normal Station and Gait] : gait and station were normal [Tenderness] : no tenderness [Masses] : masses [Incision Healed - Describe:] : Incision is healed ~M [Full ROM Unless otherwise noted:] : Full range of motion unless otherwise noted: [LE  Motor Strength Normal unless otherwise noted:] : 5/5 strength in bilateral lower extemities unless otherwise noted. [Normal] : Sensation intact to light touch. [Ankle Clonus (Beats) Right Only] : present on the right [Ankle Clonus (Beats)  Left Only] : absent on the left [2+] : left 2+

## 2021-04-02 NOTE — DATA REVIEWED
[de-identified] : MRI scan March 16, 2021 shows:\par IMPRESSION:\par Right axillary, mediastinal, and hilar lymph nodes, unchanged since 10/24/2020.\par \par Right upper lobe and left lower lobe nodules, not significantly changed.\par \par Mild splenomegaly.\par \par Enlarged prostate gland with fiducial markers again noted.\par \par Small volume pelvic ascites, slightly increased since 10/24/2020.\par \par A 6.7 cm right gluteal lipoma has not changed significantly since 10/24/2020, but is again noted to contain internal soft tissue density components, which are new since 12/14/2019. MRI is suggested for further characterization and to assess for enhancing components.\par \par Findings were communicated via secure email to Dr. MARILYNN WALKER on 3/16/2021 at 2:04 PM by Dr. Earl with read back confirmation.\par \par \par

## 2021-04-02 NOTE — DISCUSSION/SUMMARY
[All Questions Answered] : Patient (and family) had all questions answered to an agreeable level of satisfaction [Interested in Proceeding] : Patient (and family) expressed understanding and interest in proceeding with the plan as outlined [de-identified] : Because the MRI does show some changes this could more likely be some necrosis rather than any degeneration however I think it would be appropriate to get an MRI with and without contrast.  I will see him after this is done to discuss surgery.\par \par If imaging was ordered, the patient was told to make an appointment to review findings right after all imaging is completed.\par \par We discussed risks, benefits and alternatives. Rationale of care was reviewed and all questions were answered. Patient (and family) had all questions answered to her degree of the level of satisfaction. Patient (and family) expressed understanding and interest in proceeding with the plan as outlined.\par \par \par \par \par This note was done with a voice recognition transcription software and any typos are related to this rather than medical error. Surgical risks reviewed. Patient (and family) had all questions answered to an agreeable level of satisfaction. Patient (and family) expressed understanding and interest in proceeding with the plan as outlined.  \par

## 2021-04-02 NOTE — HISTORY OF PRESENT ILLNESS
[FreeTextEntry1] : Patient is back today having had new MRI with some questionable changes.  Is still being treated for his lymphoma.  He is not having much pain in his hip however does have the same back pain as before.  He has had several injections with minimal relief since last exam. [Stable] : stable [1] : currently ~his/her~ pain is 1 out of 10

## 2021-04-24 ENCOUNTER — APPOINTMENT (OUTPATIENT)
Dept: MRI IMAGING | Facility: IMAGING CENTER | Age: 81
End: 2021-04-24

## 2021-08-25 RX ORDER — TAMSULOSIN HYDROCHLORIDE 0.4 MG/1
0.4 CAPSULE ORAL
Qty: 90 | Refills: 3 | Status: ACTIVE | COMMUNITY
Start: 2021-08-25 | End: 1900-01-01

## 2021-09-01 ENCOUNTER — APPOINTMENT (OUTPATIENT)
Dept: UROLOGY | Facility: CLINIC | Age: 81
End: 2021-09-01
Payer: MEDICARE

## 2021-09-01 PROCEDURE — 51798 US URINE CAPACITY MEASURE: CPT

## 2021-09-01 PROCEDURE — 99214 OFFICE O/P EST MOD 30 MIN: CPT

## 2021-09-03 ENCOUNTER — NON-APPOINTMENT (OUTPATIENT)
Age: 81
End: 2021-09-03

## 2021-09-13 LAB
APPEARANCE: CLEAR
BACTERIA: NEGATIVE
BILIRUBIN URINE: NEGATIVE
BLOOD URINE: NEGATIVE
COLOR: NORMAL
GLUCOSE QUALITATIVE U: ABNORMAL
HYALINE CASTS: 2 /LPF
KETONES URINE: NEGATIVE
LEUKOCYTE ESTERASE URINE: ABNORMAL
MICROSCOPIC-UA: NORMAL
NITRITE URINE: NEGATIVE
PH URINE: 5.5
PROTEIN URINE: ABNORMAL
PSA FREE FLD-MCNC: 17 %
PSA FREE SERPL-MCNC: 0.06 NG/ML
PSA SERPL-MCNC: 0.37 NG/ML
RED BLOOD CELLS URINE: 2 /HPF
SPECIFIC GRAVITY URINE: 1.02
SQUAMOUS EPITHELIAL CELLS: 0 /HPF
UROBILINOGEN URINE: NORMAL
WHITE BLOOD CELLS URINE: 89 /HPF

## 2021-10-21 ENCOUNTER — NON-APPOINTMENT (OUTPATIENT)
Age: 81
End: 2021-10-21

## 2021-11-18 ENCOUNTER — APPOINTMENT (OUTPATIENT)
Dept: CARDIOLOGY | Facility: CLINIC | Age: 81
End: 2021-11-18

## 2021-12-15 ENCOUNTER — OUTPATIENT (OUTPATIENT)
Dept: OUTPATIENT SERVICES | Facility: HOSPITAL | Age: 81
LOS: 1 days | Discharge: HOME | End: 2021-12-15

## 2021-12-15 DIAGNOSIS — C85.88 OTHER SPECIFIED TYPES OF NON-HODGKIN LYMPHOMA, LYMPH NODES OF MULTIPLE SITES: ICD-10-CM

## 2021-12-17 ENCOUNTER — APPOINTMENT (OUTPATIENT)
Dept: HEMATOLOGY ONCOLOGY | Facility: CLINIC | Age: 81
End: 2021-12-17

## 2022-02-23 ENCOUNTER — OUTPATIENT (OUTPATIENT)
Dept: OUTPATIENT SERVICES | Facility: HOSPITAL | Age: 82
LOS: 1 days | Discharge: HOME | End: 2022-02-23

## 2022-02-23 DIAGNOSIS — C85.88 OTHER SPECIFIED TYPES OF NON-HODGKIN LYMPHOMA, LYMPH NODES OF MULTIPLE SITES: ICD-10-CM

## 2022-02-24 ENCOUNTER — APPOINTMENT (OUTPATIENT)
Dept: CARDIOLOGY | Facility: CLINIC | Age: 82
End: 2022-02-24
Payer: MEDICARE

## 2022-02-24 ENCOUNTER — NON-APPOINTMENT (OUTPATIENT)
Age: 82
End: 2022-02-24

## 2022-02-24 VITALS
HEART RATE: 75 BPM | HEIGHT: 68 IN | BODY MASS INDEX: 26.07 KG/M2 | WEIGHT: 172 LBS | SYSTOLIC BLOOD PRESSURE: 122 MMHG | DIASTOLIC BLOOD PRESSURE: 76 MMHG | OXYGEN SATURATION: 97 %

## 2022-02-24 PROCEDURE — 99215 OFFICE O/P EST HI 40 MIN: CPT

## 2022-02-24 PROCEDURE — 93000 ELECTROCARDIOGRAM COMPLETE: CPT

## 2022-02-25 ENCOUNTER — APPOINTMENT (OUTPATIENT)
Dept: HEMATOLOGY ONCOLOGY | Facility: CLINIC | Age: 82
End: 2022-02-25
Payer: MEDICARE

## 2022-02-25 ENCOUNTER — RESULT REVIEW (OUTPATIENT)
Age: 82
End: 2022-02-25

## 2022-02-25 ENCOUNTER — NON-APPOINTMENT (OUTPATIENT)
Age: 82
End: 2022-02-25

## 2022-02-25 VITALS
OXYGEN SATURATION: 96 % | HEIGHT: 67.99 IN | SYSTOLIC BLOOD PRESSURE: 126 MMHG | RESPIRATION RATE: 16 BRPM | WEIGHT: 169.73 LBS | DIASTOLIC BLOOD PRESSURE: 65 MMHG | TEMPERATURE: 97.3 F | HEART RATE: 77 BPM | BODY MASS INDEX: 25.72 KG/M2

## 2022-02-25 DIAGNOSIS — C85.90 NON-HODGKIN LYMPHOMA, UNSPECIFIED, UNSPECIFIED SITE: ICD-10-CM

## 2022-02-25 LAB
ALBUMIN SERPL ELPH-MCNC: 4.8 G/DL
ALP BLD-CCNC: 52 U/L
ALT SERPL-CCNC: 9 U/L
ANION GAP SERPL CALC-SCNC: 14 MMOL/L
AST SERPL-CCNC: 9 U/L
BASOPHILS # BLD AUTO: 0.02 K/UL
BASOPHILS # BLD AUTO: 0.02 K/UL — SIGNIFICANT CHANGE UP (ref 0–0.2)
BASOPHILS NFR BLD AUTO: 0.3 %
BASOPHILS NFR BLD AUTO: 0.3 % — SIGNIFICANT CHANGE UP (ref 0–2)
BILIRUB SERPL-MCNC: 0.6 MG/DL
BUN SERPL-MCNC: 20 MG/DL
CALCIUM SERPL-MCNC: 10 MG/DL
CHLORIDE SERPL-SCNC: 105 MMOL/L
CHOLEST SERPL-MCNC: 153 MG/DL
CO2 SERPL-SCNC: 22 MMOL/L
CREAT SERPL-MCNC: 1.55 MG/DL
EOSINOPHIL # BLD AUTO: 0.31 K/UL — SIGNIFICANT CHANGE UP (ref 0–0.5)
EOSINOPHIL # BLD AUTO: 0.34 K/UL
EOSINOPHIL NFR BLD AUTO: 5.1 % — SIGNIFICANT CHANGE UP (ref 0–6)
EOSINOPHIL NFR BLD AUTO: 5.3 %
ESTIMATED AVERAGE GLUCOSE: 143 MG/DL
GLUCOSE SERPL-MCNC: 94 MG/DL
HBA1C MFR BLD HPLC: 6.6 %
HCT VFR BLD CALC: 42.3 % — SIGNIFICANT CHANGE UP (ref 39–50)
HCT VFR BLD CALC: 44.5 %
HDLC SERPL-MCNC: 41 MG/DL
HGB BLD-MCNC: 14.4 G/DL — SIGNIFICANT CHANGE UP (ref 13–17)
HGB BLD-MCNC: 15 G/DL
IMM GRANULOCYTES NFR BLD AUTO: 0.3 % — SIGNIFICANT CHANGE UP (ref 0–1.5)
IMM GRANULOCYTES NFR BLD AUTO: 0.8 %
LDH SERPL-CCNC: 114 U/L
LDLC SERPL CALC-MCNC: 81 MG/DL
LYMPHOCYTES # BLD AUTO: 1.15 K/UL — SIGNIFICANT CHANGE UP (ref 1–3.3)
LYMPHOCYTES # BLD AUTO: 1.36 K/UL
LYMPHOCYTES # BLD AUTO: 18.8 % — SIGNIFICANT CHANGE UP (ref 13–44)
LYMPHOCYTES NFR BLD AUTO: 21.2 %
MAN DIFF?: NORMAL
MCHC RBC-ENTMCNC: 30.4 PG
MCHC RBC-ENTMCNC: 30.9 PG — SIGNIFICANT CHANGE UP (ref 27–34)
MCHC RBC-ENTMCNC: 33.7 GM/DL
MCHC RBC-ENTMCNC: 34 G/DL — SIGNIFICANT CHANGE UP (ref 32–36)
MCV RBC AUTO: 90.1 FL
MCV RBC AUTO: 90.8 FL — SIGNIFICANT CHANGE UP (ref 80–100)
MONOCYTES # BLD AUTO: 0.58 K/UL — SIGNIFICANT CHANGE UP (ref 0–0.9)
MONOCYTES # BLD AUTO: 0.69 K/UL
MONOCYTES NFR BLD AUTO: 10.8 %
MONOCYTES NFR BLD AUTO: 9.5 % — SIGNIFICANT CHANGE UP (ref 2–14)
NEUTROPHILS # BLD AUTO: 3.95 K/UL
NEUTROPHILS # BLD AUTO: 4.04 K/UL — SIGNIFICANT CHANGE UP (ref 1.8–7.4)
NEUTROPHILS NFR BLD AUTO: 61.6 %
NEUTROPHILS NFR BLD AUTO: 66 % — SIGNIFICANT CHANGE UP (ref 43–77)
NONHDLC SERPL-MCNC: 112 MG/DL
NRBC # BLD: 0 /100 WBCS — SIGNIFICANT CHANGE UP (ref 0–0)
NT-PROBNP SERPL-MCNC: 342 PG/ML
PLATELET # BLD AUTO: 123 K/UL — LOW (ref 150–400)
PLATELET # BLD AUTO: 148 K/UL
POTASSIUM SERPL-SCNC: 5.1 MMOL/L
PROT SERPL-MCNC: 7.4 G/DL
RBC # BLD: 4.66 M/UL — SIGNIFICANT CHANGE UP (ref 4.2–5.8)
RBC # BLD: 4.94 M/UL
RBC # FLD: 13.9 % — SIGNIFICANT CHANGE UP (ref 10.3–14.5)
RBC # FLD: 14.1 %
SODIUM SERPL-SCNC: 141 MMOL/L
TRIGL SERPL-MCNC: 155 MG/DL
TSH SERPL-ACNC: 3.78 UIU/ML
WBC # BLD: 6.12 K/UL — SIGNIFICANT CHANGE UP (ref 3.8–10.5)
WBC # FLD AUTO: 6.12 K/UL — SIGNIFICANT CHANGE UP (ref 3.8–10.5)
WBC # FLD AUTO: 6.41 K/UL

## 2022-02-25 PROCEDURE — 99215 OFFICE O/P EST HI 40 MIN: CPT

## 2022-02-28 NOTE — PHYSICAL EXAM
[Restricted in physically strenuous activity but ambulatory and able to carry out work of a light or sedentary nature] : Status 1- Restricted in physically strenuous activity but ambulatory and able to carry out work of a light or sedentary nature, e.g., light house work, office work [Normal Male] : prostate smooth, symmetric with no modularity or induration [Normal] : affect appropriate [de-identified] : soft mass on right thigh overlying the lateral hip, non-tender, no eccyhmosis, 3x4 fbs, no change. [de-identified] : small 1-2 fb sucutaneous lesion right cheek

## 2022-02-28 NOTE — REVIEW OF SYSTEMS
[Fatigue] : fatigue [Constipation] : constipation [Negative] : Constitutional [FreeTextEntry5] : dyspnea during exertion. [FreeTextEntry9] : back pain is worse

## 2022-02-28 NOTE — HISTORY OF PRESENT ILLNESS
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 The lung cancer was diagnosed in May 2013, was resected as a T1 lesion and received 4 cycles of cisplatin and etoposide between June and September 2013. 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 Platelet count has been in the 90k/uL range and there is no evidence of bleeding or bruising. 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 I reviewed with radiology -- it showed a 3x2.5 cm mass in 2010 (retrospectively) and now is 6x4 cm. The lesion is homogeneous on scan and is not painful.\par \par I spoke with Dr. Cory cortez 3/24/15 who notes Nan 6 prostate cancer from 11/2014.  He ordered bone scan and will refer to Dr. Cardoza. Patient completed EBRT for 6 weeks in DEC 2015. Has no further diarrhea or blood in stool. Now eating well, weight is stable. \par \par 7/20/16 CT of chest c/w: IMPRESSION:  Emphysema  with  right  lower  lobe  prior  wedge  resection  and resolution  of  the  two  nodules  that  were  new  on  the  prior  study.  Left  lower lobe  4  mm  nodule  stable  from  several  prior  studies.  Continued  follow-up recommended.\par \par 2/10/17- CT of chest 2/4/17 without recurrence of disease. Patient reports he continues with arthralgias. Also continues with intermittent dizziness with position changes. He has not seen his cardiologist yet. Patient reports diarrhea has resolved and has been followed up with GI.\par \par 4/6/2018: Patient complain of bilateral lower lumbar pain and radiation down back of right leg from sciatic notch to lateral and anterior thigh. This makes it difficult to walk. Pain has been present for ~ 1 year but now getting worse. While in bed or sitting in chair, there is no pain. Otherwise he has no other new complaints. He had an MRI at [facility] on 3/2/18 ordered by Dr. Eddie Graves.  Various signal changes, arthritic changes and disk issues are seen. Last MRI was done at [facility] 10/2016. CAT 2/2018 of T/A/P showed stable and no evidence of relapse. Patient states he notices food stuck in throat intermittently over the past year. 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 After some "treatment" this has gotten better. Also he complain of right low back and hip pain. He had an injection to back and hip that did not help with Dr. Gutiérrez [phone]. \par \par 2/8/2019: today patient complain of pain in low back and right hip to anterior thigh. Also notes pain when stands in the left upper anterior thigh when he rises from standing. He saw Dr. Humphrey who is a physiatrist. XRAYs of right hip ordered. This was compared to CAT 11/2018 and showed some chronic changes. Patient advised to see a Neurosurgeon\par \par 5/17/2019: Mr Wan is here for follow up.  Today he is c/o dizziness which started once her got out his car.  After sitting down her feels better.  Mr Wan continues to complain about lower back pain.  He was seen by Dr Interiano (Ortho) who referred the patient to Dr Harmon.  He was seen by Dr Harmon (Spine) on 2/2019.  Patient had trigger point injections 4 times and PT with no help.  He recommended physical therapy and home exercise program with anti-inflammatory medication as needed.\par \par 11/19/2019: \par Low back pain - has been undergoing injections again with Dr. Allison in [place]. He states no benefit yet.\par Prostate cancer - [place] 6 prostate cancer from 11/2014. s/p external bean radiation. PSA stable. Follows with Dr. Ray.\par \par 10/2/2020: Patient is here for an annual follow up.\par 1) Lower back pain:  Patient continues to complain of lower back.  It is worse during ambulation and better when sitting down.  He is using an "opioid" however does not remember which medication. \par 2) Neuroendocrine: CAT chest, abdomen/pelvis on 12/2019 demonstrate no evidence of recurrence.\par 3) Lymphoma: Denies fever, chills, adenopathy, change in weight or appetite.\par 4) Constipation: He is scheduled for colonoscopy/endoscopy with  Dr. Stefan Estevez (GI) in mid October 2020.\par 5) Social: He lives at home alone.  He is able to perform his routine/instrumental ADLs without restriction.\par \par 2/25/2022:\par 1) Lymphoma: Remains in remission for his low grade lymphoma.\par Last CT chest, abdomen/pelvis on 3/2021 revealed unchanged lymph nodes.\par 2) Neuroendocrine CA: Patient remains in remission for his stage one neuroendocrine CA.\par 3) Possible thrombus: Patient was evaluated by cardiology, Dr. Merchant.  US Duplex ordered.  Patient c/o chest discomfort and palpitation during exertion.  TTE and nuclear stress ordered.\par 4) COVID-19 vaccine: Patient received all three moderna vaccines.\par 5) Social: Patient lives home alone however has home attendant.\par \par  [de-identified] : neuroendocrine, high grade [de-identified] : Petr Humphrey: [phone]\par Stefan Estevez, ()

## 2022-03-02 ENCOUNTER — APPOINTMENT (OUTPATIENT)
Dept: UROLOGY | Facility: CLINIC | Age: 82
End: 2022-03-02

## 2022-03-03 ENCOUNTER — RESULT REVIEW (OUTPATIENT)
Age: 82
End: 2022-03-03

## 2022-03-05 ENCOUNTER — APPOINTMENT (OUTPATIENT)
Dept: CT IMAGING | Facility: IMAGING CENTER | Age: 82
End: 2022-03-05
Payer: MEDICARE

## 2022-03-05 ENCOUNTER — OUTPATIENT (OUTPATIENT)
Dept: OUTPATIENT SERVICES | Facility: HOSPITAL | Age: 82
LOS: 1 days | End: 2022-03-05
Payer: MEDICARE

## 2022-03-05 DIAGNOSIS — C7A.8 OTHER MALIGNANT NEUROENDOCRINE TUMORS: ICD-10-CM

## 2022-03-05 PROCEDURE — 71260 CT THORAX DX C+: CPT | Mod: MG

## 2022-03-05 PROCEDURE — 71260 CT THORAX DX C+: CPT | Mod: 26,MG

## 2022-03-05 PROCEDURE — 74177 CT ABD & PELVIS W/CONTRAST: CPT | Mod: MG

## 2022-03-05 PROCEDURE — 74177 CT ABD & PELVIS W/CONTRAST: CPT | Mod: 26,MG

## 2022-03-05 PROCEDURE — G1004: CPT

## 2022-03-25 ENCOUNTER — APPOINTMENT (OUTPATIENT)
Dept: CARDIOLOGY | Facility: CLINIC | Age: 82
End: 2022-03-25
Payer: MEDICARE

## 2022-03-25 PROCEDURE — 93970 EXTREMITY STUDY: CPT

## 2022-03-25 PROCEDURE — 93306 TTE W/DOPPLER COMPLETE: CPT

## 2022-03-28 ENCOUNTER — APPOINTMENT (OUTPATIENT)
Dept: CARDIOLOGY | Facility: CLINIC | Age: 82
End: 2022-03-28

## 2022-04-12 ENCOUNTER — OUTPATIENT (OUTPATIENT)
Dept: OUTPATIENT SERVICES | Facility: HOSPITAL | Age: 82
LOS: 1 days | Discharge: HOME | End: 2022-04-12

## 2022-04-12 DIAGNOSIS — C85.88 OTHER SPECIFIED TYPES OF NON-HODGKIN LYMPHOMA, LYMPH NODES OF MULTIPLE SITES: ICD-10-CM

## 2022-04-15 ENCOUNTER — RESULT REVIEW (OUTPATIENT)
Age: 82
End: 2022-04-15

## 2022-04-15 ENCOUNTER — APPOINTMENT (OUTPATIENT)
Dept: HEMATOLOGY ONCOLOGY | Facility: CLINIC | Age: 82
End: 2022-04-15
Payer: MEDICARE

## 2022-04-15 VITALS
BODY MASS INDEX: 26.56 KG/M2 | WEIGHT: 175.26 LBS | HEART RATE: 80 BPM | HEIGHT: 67.99 IN | RESPIRATION RATE: 16 BRPM | SYSTOLIC BLOOD PRESSURE: 126 MMHG | DIASTOLIC BLOOD PRESSURE: 75 MMHG | OXYGEN SATURATION: 97 % | TEMPERATURE: 97.4 F

## 2022-04-15 DIAGNOSIS — R10.9 UNSPECIFIED ABDOMINAL PAIN: ICD-10-CM

## 2022-04-15 LAB
BASOPHILS # BLD AUTO: 0.03 K/UL — SIGNIFICANT CHANGE UP (ref 0–0.2)
BASOPHILS NFR BLD AUTO: 0.5 % — SIGNIFICANT CHANGE UP (ref 0–2)
EOSINOPHIL # BLD AUTO: 0.28 K/UL — SIGNIFICANT CHANGE UP (ref 0–0.5)
EOSINOPHIL NFR BLD AUTO: 4.5 % — SIGNIFICANT CHANGE UP (ref 0–6)
HCT VFR BLD CALC: 44 % — SIGNIFICANT CHANGE UP (ref 39–50)
HGB BLD-MCNC: 15.2 G/DL — SIGNIFICANT CHANGE UP (ref 13–17)
IMM GRANULOCYTES NFR BLD AUTO: 0.6 % — SIGNIFICANT CHANGE UP (ref 0–1.5)
LYMPHOCYTES # BLD AUTO: 1.25 K/UL — SIGNIFICANT CHANGE UP (ref 1–3.3)
LYMPHOCYTES # BLD AUTO: 20.1 % — SIGNIFICANT CHANGE UP (ref 13–44)
MCHC RBC-ENTMCNC: 31 PG — SIGNIFICANT CHANGE UP (ref 27–34)
MCHC RBC-ENTMCNC: 34.5 G/DL — SIGNIFICANT CHANGE UP (ref 32–36)
MCV RBC AUTO: 89.6 FL — SIGNIFICANT CHANGE UP (ref 80–100)
MONOCYTES # BLD AUTO: 0.69 K/UL — SIGNIFICANT CHANGE UP (ref 0–0.9)
MONOCYTES NFR BLD AUTO: 11.1 % — SIGNIFICANT CHANGE UP (ref 2–14)
NEUTROPHILS # BLD AUTO: 3.92 K/UL — SIGNIFICANT CHANGE UP (ref 1.8–7.4)
NEUTROPHILS NFR BLD AUTO: 63.2 % — SIGNIFICANT CHANGE UP (ref 43–77)
NRBC # BLD: 0 /100 WBCS — SIGNIFICANT CHANGE UP (ref 0–0)
PLATELET # BLD AUTO: 122 K/UL — LOW (ref 150–400)
RBC # BLD: 4.91 M/UL — SIGNIFICANT CHANGE UP (ref 4.2–5.8)
RBC # FLD: 13.3 % — SIGNIFICANT CHANGE UP (ref 10.3–14.5)
WBC # BLD: 6.21 K/UL — SIGNIFICANT CHANGE UP (ref 3.8–10.5)
WBC # FLD AUTO: 6.21 K/UL — SIGNIFICANT CHANGE UP (ref 3.8–10.5)

## 2022-04-15 PROCEDURE — 99214 OFFICE O/P EST MOD 30 MIN: CPT

## 2022-04-15 NOTE — HISTORY OF PRESENT ILLNESS
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 The lung cancer was diagnosed in May 2013, was resected as a T1 lesion and received 4 cycles of cisplatin and etoposide between June and September 2013. 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 Platelet count has been in the 90k/uL range and there is no evidence of bleeding or bruising. 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 I reviewed with radiology -- it showed a 3x2.5 cm mass in 2010 (retrospectively) and now is 6x4 cm. The lesion is homogeneous on scan and is not painful.\par \par I spoke with Dr. Cory cortez 3/24/15 who notes Nan 6 prostate cancer from 11/2014.  He ordered bone scan and will refer to Dr. Cardoza. Patient completed EBRT for 6 weeks in DEC 2015. Has no further diarrhea or blood in stool. Now eating well, weight is stable. \par \par 7/20/16 CT of chest c/w: IMPRESSION:  Emphysema  with  right  lower  lobe  prior  wedge  resection  and resolution  of  the  two  nodules  that  were  new  on  the  prior  study.  Left  lower lobe  4  mm  nodule  stable  from  several  prior  studies.  Continued  follow-up recommended.\par \par 2/10/17- CT of chest 2/4/17 without recurrence of disease. Patient reports he continues with arthralgias. Also continues with intermittent dizziness with position changes. He has not seen his cardiologist yet. Patient reports diarrhea has resolved and has been followed up with GI.\par \par 4/6/2018: Patient complain of bilateral lower lumbar pain and radiation down back of right leg from sciatic notch to lateral and anterior thigh. This makes it difficult to walk. Pain has been present for ~ 1 year but now getting worse. While in bed or sitting in chair, there is no pain. Otherwise he has no other new complaints. He had an MRI at [facility] on 3/2/18 ordered by Dr. Eddie Graves.  Various signal changes, arthritic changes and disk issues are seen. Last MRI was done at [facility] 10/2016. CAT 2/2018 of T/A/P showed stable and no evidence of relapse. Patient states he notices food stuck in throat intermittently over the past year. 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 After some "treatment" this has gotten better. Also he complain of right low back and hip pain. He had an injection to back and hip that did not help with Dr. Gutiérrez [phone]. \par \par 2/8/2019: today patient complain of pain in low back and right hip to anterior thigh. Also notes pain when stands in the left upper anterior thigh when he rises from standing. He saw Dr. Humphrey who is a physiatrist. XRAYs of right hip ordered. This was compared to CAT 11/2018 and showed some chronic changes. Patient advised to see a Neurosurgeon\par \par 5/17/2019: Mr Wan is here for follow up.  Today he is c/o dizziness which started once her got out his car.  After sitting down her feels better.  Mr Wan continues to complain about lower back pain.  He was seen by Dr Interiano (Ortho) who referred the patient to Dr Harmon.  He was seen by Dr Harmon (Spine) on 2/2019.  Patient had trigger point injections 4 times and PT with no help.  He recommended physical therapy and home exercise program with anti-inflammatory medication as needed.\par \par 11/19/2019: \par Low back pain - has been undergoing injections again with Dr. Allison in [place]. He states no benefit yet.\par Prostate cancer - [place] 6 prostate cancer from 11/2014. s/p external bean radiation. PSA stable. Follows with Dr. Ray.\par \par 10/2/2020: Patient is here for an annual follow up.\par 1) Lower back pain:  Patient continues to complain of lower back.  It is worse during ambulation and better when sitting down.  He is using an "opioid" however does not remember which medication. \par 2) Neuroendocrine: CAT chest, abdomen/pelvis on 12/2019 demonstrate no evidence of recurrence.\par 3) Lymphoma: Denies fever, chills, adenopathy, change in weight or appetite.\par 4) Constipation: He is scheduled for colonoscopy/endoscopy with  Dr. Stefan Estevez (GI) in mid October 2020.\par 5) Social: He lives at home alone.  He is able to perform his routine/instrumental ADLs without restriction.\par \par 2/25/2022:\par 1) Lymphoma: Remains in remission for his low grade lymphoma.\par Last CT chest, abdomen/pelvis on 3/2021 revealed unchanged lymph nodes.\par 2) Neuroendocrine CA: Patient remains in remission for his stage one neuroendocrine CA.\par 3) Possible thrombus: Patient was evaluated by cardiology, Dr. Merchant.  US Duplex ordered.  Patient c/o chest discomfort and palpitation during exertion.  TTE and nuclear stress ordered.\par 4) COVID-19 vaccine: Patient received all three moderna vaccines.\par 5) Social: Patient lives home alone however has home attendant.\par \par 4/15/2022:\par Patient presents to the office today c/o of abdominal pain.\par The abdominal pain is located on LUQ that radiates towards the left back.\par As per patient, pain started for a couple of months.\par Patient rates the pain is 4/10 in severity.\par The pain is worse when touched.  Denies it currently.\par It is not associated with movements or meals.\par Denies nausea, vomiting, diarrhea, dysuria, flank pain or hematuria.\par His bowel movements are erratic and does not have a bowel movement every day.\par He is not using any stool softeners.\par \par  [de-identified] : neuroendocrine, high grade [de-identified] : Petr Humphrey: [phone]\par Stefan Estevez, ()

## 2022-04-15 NOTE — REVIEW OF SYSTEMS
[Fatigue] : fatigue [Constipation] : constipation [Negative] : Allergic/Immunologic [FreeTextEntry5] : dyspnea during exertion. [FreeTextEntry9] : back pain is worse

## 2022-04-15 NOTE — PHYSICAL EXAM
[Restricted in physically strenuous activity but ambulatory and able to carry out work of a light or sedentary nature] : Status 1- Restricted in physically strenuous activity but ambulatory and able to carry out work of a light or sedentary nature, e.g., light house work, office work [Normal Male] : prostate smooth, symmetric with no modularity or induration [Normal] : affect appropriate [de-identified] : soft mass on right thigh overlying the lateral hip, non-tender, no eccyhmosis, 3x4 fbs, no change. [de-identified] : small 1-2 fb sucutaneous lesion right cheek

## 2022-04-18 LAB
ALBUMIN SERPL ELPH-MCNC: 4.6 G/DL
ALP BLD-CCNC: 59 U/L
ALT SERPL-CCNC: 7 U/L
AMYLASE/CREAT SERPL: 112 U/L
ANION GAP SERPL CALC-SCNC: 16 MMOL/L
AST SERPL-CCNC: 11 U/L
BILIRUB SERPL-MCNC: 0.6 MG/DL
BUN SERPL-MCNC: 21 MG/DL
CALCIUM SERPL-MCNC: 10.3 MG/DL
CEA SERPL-MCNC: 1.3 NG/ML
CHLORIDE SERPL-SCNC: 100 MMOL/L
CO2 SERPL-SCNC: 21 MMOL/L
CREAT SERPL-MCNC: 1.5 MG/DL
EGFR: 46 ML/MIN/1.73M2
GLUCOSE SERPL-MCNC: 146 MG/DL
LPL SERPL-CCNC: 48 U/L
POTASSIUM SERPL-SCNC: 4.8 MMOL/L
PROT SERPL-MCNC: 7.1 G/DL
SODIUM SERPL-SCNC: 137 MMOL/L

## 2022-04-26 NOTE — PHYSICAL EXAM
Biopsy Photograph Reviewed: Yes [Restricted in physically strenuous activity but ambulatory and able to carry out work of a light or sedentary nature] : Status 1- Restricted in physically strenuous activity but ambulatory and able to carry out work of a light or sedentary nature, e.g., light house work, office work [Normal Male] : prostate smooth, symmetric with no modularity or induration [Normal] : affect appropriate [de-identified] : soft mass on right thigh overlying the lateral hip, non-tender, no eccyhmosis, 3x4 fbs, no change.

## 2022-06-06 ENCOUNTER — APPOINTMENT (OUTPATIENT)
Dept: CARDIOLOGY | Facility: CLINIC | Age: 82
End: 2022-06-06
Payer: MEDICARE

## 2022-06-06 VITALS
BODY MASS INDEX: 26.52 KG/M2 | SYSTOLIC BLOOD PRESSURE: 160 MMHG | HEIGHT: 68 IN | WEIGHT: 175 LBS | DIASTOLIC BLOOD PRESSURE: 80 MMHG | OXYGEN SATURATION: 96 % | HEART RATE: 77 BPM

## 2022-06-06 PROCEDURE — 99215 OFFICE O/P EST HI 40 MIN: CPT

## 2022-08-19 ENCOUNTER — NON-APPOINTMENT (OUTPATIENT)
Age: 82
End: 2022-08-19

## 2022-09-22 ENCOUNTER — RX RENEWAL (OUTPATIENT)
Age: 82
End: 2022-09-22

## 2022-10-05 ENCOUNTER — RX RENEWAL (OUTPATIENT)
Age: 82
End: 2022-10-05

## 2022-12-20 ENCOUNTER — RX RENEWAL (OUTPATIENT)
Age: 82
End: 2022-12-20

## 2023-04-07 ENCOUNTER — APPOINTMENT (OUTPATIENT)
Dept: UROLOGY | Facility: CLINIC | Age: 83
End: 2023-04-07

## 2023-04-11 ENCOUNTER — RX RENEWAL (OUTPATIENT)
Age: 83
End: 2023-04-11

## 2023-04-19 ENCOUNTER — APPOINTMENT (OUTPATIENT)
Dept: UROLOGY | Facility: CLINIC | Age: 83
End: 2023-04-19
Payer: MEDICARE

## 2023-04-19 VITALS
BODY MASS INDEX: 26.52 KG/M2 | SYSTOLIC BLOOD PRESSURE: 117 MMHG | HEIGHT: 68 IN | DIASTOLIC BLOOD PRESSURE: 64 MMHG | OXYGEN SATURATION: 97 % | HEART RATE: 78 BPM | TEMPERATURE: 98 F | WEIGHT: 175 LBS

## 2023-04-19 PROCEDURE — 99214 OFFICE O/P EST MOD 30 MIN: CPT

## 2023-04-19 NOTE — PHYSICAL EXAM
[Normal Appearance] : normal appearance [Well Groomed] : well groomed [Edema] : no peripheral edema [Exaggerated Use Of Accessory Muscles For Inspiration] : no accessory muscle use [Abdomen Soft] : soft [Abdomen Tenderness] : non-tender [Costovertebral Angle Tenderness] : no ~M costovertebral angle tenderness [Prostate Tenderness] : the prostate was not tender [No Prostate Nodules] : no prostate nodules [Prostate Size ___ gm] : prostate size [unfilled] gm [Normal Station and Gait] : the gait and station were normal for the patient's age [] : no rash [No Focal Deficits] : no focal deficits [Not Anxious] : not anxious [No Palpable Adenopathy] : no palpable adenopathy

## 2023-04-19 NOTE — ASSESSMENT
[FreeTextEntry1] : 83 yo male with history of chris 6 prostate cancer sp 6 weeks of radiation completed in Dec 2015. His PSA was 28.9 and following radiation decreased and was in the range of 0.3. Last PSA Sept 2021 was 0.37. He has had urinary symptoms in the past and will take flomax without any current complaints. For his ED he has trial cialis in the past but had no improvements. He has a significant cardiac history. Will reach out to his cardiologist to ensure he is cleared to trial viagra\par \par Plan\par - PVR 65 mL\par - PSA\par - Urinalysis and Urine culture at follow up\par - C/w Flomax\par - Call with results\par - FU in 6 months\par - Messaged Dr. Merchant regarding trial of viagra

## 2023-04-19 NOTE — HISTORY OF PRESENT ILLNESS
[FreeTextEntry1] : 81 yo male with history of  prostate cancer sp radiation in . his PSAs have been checked over the years and have been stable. His las PSA was checked in 2021 which was 0.37. He has also been on flomax for urinary symptoms. Currently does not have any complaints. Denies dysuria, frequency, urgency, hematuria. \par \par He also has c/o erectile dysfunction. States he has trialed cialis in the past but with little to no effect. He is unable to get erections. He does have significant cardiac history and is being followed by Dr. Merchant.

## 2023-04-20 LAB — PSA SERPL-MCNC: 0.41 NG/ML

## 2023-05-02 ENCOUNTER — APPOINTMENT (OUTPATIENT)
Dept: CARDIOLOGY | Facility: CLINIC | Age: 83
End: 2023-05-02

## 2023-05-12 ENCOUNTER — NON-APPOINTMENT (OUTPATIENT)
Age: 83
End: 2023-05-12

## 2023-05-12 ENCOUNTER — APPOINTMENT (OUTPATIENT)
Dept: UROLOGY | Facility: CLINIC | Age: 83
End: 2023-05-12

## 2023-05-12 ENCOUNTER — APPOINTMENT (OUTPATIENT)
Dept: CARDIOLOGY | Facility: CLINIC | Age: 83
End: 2023-05-12
Payer: MEDICARE

## 2023-05-12 VITALS
HEIGHT: 68 IN | HEART RATE: 76 BPM | WEIGHT: 164 LBS | OXYGEN SATURATION: 96 % | SYSTOLIC BLOOD PRESSURE: 116 MMHG | BODY MASS INDEX: 24.86 KG/M2 | DIASTOLIC BLOOD PRESSURE: 62 MMHG

## 2023-05-12 PROCEDURE — 99215 OFFICE O/P EST HI 40 MIN: CPT

## 2023-05-12 PROCEDURE — 93000 ELECTROCARDIOGRAM COMPLETE: CPT

## 2023-05-17 ENCOUNTER — APPOINTMENT (OUTPATIENT)
Dept: UROLOGY | Facility: CLINIC | Age: 83
End: 2023-05-17
Payer: MEDICARE

## 2023-05-17 DIAGNOSIS — Z92.3 PERSONAL HISTORY OF IRRADIATION: ICD-10-CM

## 2023-05-17 DIAGNOSIS — N40.0 BENIGN PROSTATIC HYPERPLASIA WITHOUT LOWER URINARY TRACT SYMPMS: ICD-10-CM

## 2023-05-17 DIAGNOSIS — N52.9 MALE ERECTILE DYSFUNCTION, UNSPECIFIED: ICD-10-CM

## 2023-05-17 PROCEDURE — 99213 OFFICE O/P EST LOW 20 MIN: CPT

## 2023-05-17 NOTE — PHYSICAL EXAM
[Normal Appearance] : normal appearance [Abdomen Tenderness] : non-tender [Costovertebral Angle Tenderness] : no ~M costovertebral angle tenderness [Urethral Meatus] : meatus normal [Epididymis] : the epididymides were normal [Testes Tenderness] : no tenderness of the testes

## 2023-05-17 NOTE — HISTORY OF PRESENT ILLNESS
[FreeTextEntry1] : 83 yo male with history of Gl 6 prostate cancer sp radiation in 2015. \par Last PSA 0.41 4/2023\par \par He has also been on flomax for urinary symptoms. Currently does not have any complaints. Denies dysuria, frequency, urgency, hematuria. Does have nocturia x 3-4. \par \par Also with ED - reports inability to get an erection.  Has significant cardiac history, including inferior wall MI status post PCI, blood clot, LV dysfunction, hypertension, hyperlipidemia.

## 2023-05-17 NOTE — ASSESSMENT
[FreeTextEntry1] : 83 yo male with:\par \par #Nan 6 prostate cancer sp 6 weeks of radiation\par - PSA 4/2023 0.41\par - Trend reviewed\par - Annual PSA\par \par #LUTS\par - PVR 47 mL\par - Continue flomax\par - UA\par \par #ED\par - Significant cardiac comorbidities - will ask cardiologist for clearance for PDE5i

## 2023-05-31 ENCOUNTER — APPOINTMENT (OUTPATIENT)
Dept: THORACIC SURGERY | Facility: CLINIC | Age: 83
End: 2023-05-31

## 2023-06-12 ENCOUNTER — APPOINTMENT (OUTPATIENT)
Dept: CARDIOLOGY | Facility: CLINIC | Age: 83
End: 2023-06-12

## 2023-06-16 ENCOUNTER — APPOINTMENT (OUTPATIENT)
Dept: CARDIOLOGY | Facility: CLINIC | Age: 83
End: 2023-06-16

## 2023-06-16 ENCOUNTER — APPOINTMENT (OUTPATIENT)
Dept: CARDIOLOGY | Facility: CLINIC | Age: 83
End: 2023-06-16
Payer: MEDICARE

## 2023-06-16 VITALS
BODY MASS INDEX: 25.01 KG/M2 | RESPIRATION RATE: 16 BRPM | SYSTOLIC BLOOD PRESSURE: 136 MMHG | OXYGEN SATURATION: 98 % | WEIGHT: 165 LBS | HEIGHT: 68 IN | TEMPERATURE: 97.4 F | HEART RATE: 83 BPM | DIASTOLIC BLOOD PRESSURE: 75 MMHG

## 2023-06-16 DIAGNOSIS — I73.9 PERIPHERAL VASCULAR DISEASE, UNSPECIFIED: ICD-10-CM

## 2023-06-16 PROCEDURE — 99215 OFFICE O/P EST HI 40 MIN: CPT

## 2023-06-29 ENCOUNTER — APPOINTMENT (OUTPATIENT)
Dept: CARDIOLOGY | Facility: CLINIC | Age: 83
End: 2023-06-29
Payer: MEDICARE

## 2023-06-29 VITALS
OXYGEN SATURATION: 96 % | WEIGHT: 170 LBS | HEART RATE: 80 BPM | DIASTOLIC BLOOD PRESSURE: 66 MMHG | BODY MASS INDEX: 25.76 KG/M2 | HEIGHT: 68 IN | SYSTOLIC BLOOD PRESSURE: 122 MMHG

## 2023-06-29 DIAGNOSIS — I25.10 ATHEROSCLEROTIC HEART DISEASE OF NATIVE CORONARY ARTERY W/OUT ANGINA PECTORIS: ICD-10-CM

## 2023-06-29 DIAGNOSIS — E11.9 TYPE 2 DIABETES MELLITUS W/OUT COMPLICATIONS: ICD-10-CM

## 2023-06-29 PROCEDURE — 99215 OFFICE O/P EST HI 40 MIN: CPT

## 2023-07-14 ENCOUNTER — OUTPATIENT (OUTPATIENT)
Dept: OUTPATIENT SERVICES | Facility: HOSPITAL | Age: 83
LOS: 1 days | Discharge: HOME | End: 2023-07-14

## 2023-07-14 DIAGNOSIS — C85.88 OTHER SPECIFIED TYPES OF NON-HODGKIN LYMPHOMA, LYMPH NODES OF MULTIPLE SITES: ICD-10-CM

## 2023-07-21 ENCOUNTER — APPOINTMENT (OUTPATIENT)
Dept: HEMATOLOGY ONCOLOGY | Facility: CLINIC | Age: 83
End: 2023-07-21

## 2023-07-25 ENCOUNTER — RESULT REVIEW (OUTPATIENT)
Age: 83
End: 2023-07-25

## 2023-07-25 ENCOUNTER — OUTPATIENT (OUTPATIENT)
Dept: OUTPATIENT SERVICES | Facility: HOSPITAL | Age: 83
LOS: 1 days | End: 2023-07-25
Payer: MEDICARE

## 2023-07-25 ENCOUNTER — APPOINTMENT (OUTPATIENT)
Dept: CT IMAGING | Facility: IMAGING CENTER | Age: 83
End: 2023-07-25
Payer: MEDICARE

## 2023-07-25 DIAGNOSIS — C34.91 MALIGNANT NEOPLASM OF UNSPECIFIED PART OF RIGHT BRONCHUS OR LUNG: ICD-10-CM

## 2023-07-25 DIAGNOSIS — Z00.8 ENCOUNTER FOR OTHER GENERAL EXAMINATION: ICD-10-CM

## 2023-07-25 PROCEDURE — 71250 CT THORAX DX C-: CPT | Mod: MH

## 2023-07-25 PROCEDURE — 71250 CT THORAX DX C-: CPT | Mod: 26,MH

## 2023-07-28 ENCOUNTER — RESULT REVIEW (OUTPATIENT)
Age: 83
End: 2023-07-28

## 2023-07-28 ENCOUNTER — APPOINTMENT (OUTPATIENT)
Dept: HEMATOLOGY ONCOLOGY | Facility: CLINIC | Age: 83
End: 2023-07-28
Payer: MEDICARE

## 2023-07-28 VITALS
RESPIRATION RATE: 16 BRPM | WEIGHT: 168.65 LBS | OXYGEN SATURATION: 96 % | SYSTOLIC BLOOD PRESSURE: 112 MMHG | HEART RATE: 104 BPM | DIASTOLIC BLOOD PRESSURE: 75 MMHG | TEMPERATURE: 96.4 F | BODY MASS INDEX: 25.64 KG/M2

## 2023-07-28 DIAGNOSIS — M54.16 RADICULOPATHY, LUMBAR REGION: ICD-10-CM

## 2023-07-28 LAB
BASOPHILS # BLD AUTO: 0.04 K/UL — SIGNIFICANT CHANGE UP (ref 0–0.2)
BASOPHILS NFR BLD AUTO: 0.6 % — SIGNIFICANT CHANGE UP (ref 0–2)
EOSINOPHIL # BLD AUTO: 0.22 K/UL — SIGNIFICANT CHANGE UP (ref 0–0.5)
EOSINOPHIL NFR BLD AUTO: 3.2 % — SIGNIFICANT CHANGE UP (ref 0–6)
HCT VFR BLD CALC: 44.1 % — SIGNIFICANT CHANGE UP (ref 39–50)
HGB BLD-MCNC: 15.5 G/DL — SIGNIFICANT CHANGE UP (ref 13–17)
IMM GRANULOCYTES NFR BLD AUTO: 0.7 % — SIGNIFICANT CHANGE UP (ref 0–0.9)
LYMPHOCYTES # BLD AUTO: 1.37 K/UL — SIGNIFICANT CHANGE UP (ref 1–3.3)
LYMPHOCYTES # BLD AUTO: 19.8 % — SIGNIFICANT CHANGE UP (ref 13–44)
MCHC RBC-ENTMCNC: 31.8 PG — SIGNIFICANT CHANGE UP (ref 27–34)
MCHC RBC-ENTMCNC: 35.1 G/DL — SIGNIFICANT CHANGE UP (ref 32–36)
MCV RBC AUTO: 90.4 FL — SIGNIFICANT CHANGE UP (ref 80–100)
MONOCYTES # BLD AUTO: 0.97 K/UL — HIGH (ref 0–0.9)
MONOCYTES NFR BLD AUTO: 14 % — SIGNIFICANT CHANGE UP (ref 2–14)
NEUTROPHILS # BLD AUTO: 4.27 K/UL — SIGNIFICANT CHANGE UP (ref 1.8–7.4)
NEUTROPHILS NFR BLD AUTO: 61.7 % — SIGNIFICANT CHANGE UP (ref 43–77)
NRBC # BLD: 0 /100 WBCS — SIGNIFICANT CHANGE UP (ref 0–0)
PLATELET # BLD AUTO: 115 K/UL — LOW (ref 150–400)
RBC # BLD: 4.88 M/UL — SIGNIFICANT CHANGE UP (ref 4.2–5.8)
RBC # FLD: 13.6 % — SIGNIFICANT CHANGE UP (ref 10.3–14.5)
WBC # BLD: 6.92 K/UL — SIGNIFICANT CHANGE UP (ref 3.8–10.5)
WBC # FLD AUTO: 6.92 K/UL — SIGNIFICANT CHANGE UP (ref 3.8–10.5)

## 2023-07-28 PROCEDURE — 99215 OFFICE O/P EST HI 40 MIN: CPT

## 2023-07-28 NOTE — ASSESSMENT
[FreeTextEntry1] : LBP Patient states that has had chronic left LBP\par Increase pain when walks.\par He also notes pain in left sacral notch and radiates down right leg.\par He notes pain increase when goes to walk.\par \par Right shoulder blade pain for past 2 months.\par \par Using Tylenol with temporary relief, and advise to use more routinely.\par \par Lymphoma: Remains in remission for his low grade lymphoma.\par Last CT chest, abdomen/pelvis on 3/2021 revealed unchanged lymph nodes.\par \par Neuroendocrine CA: Patient remains in remission for his stage one neuroendocrine CA.\par Lung RUL mass new on 7/25/23 on CT ordered by Dr Malloy.\par \par CAD:  inferior wall MI s/p PCI, very mild LV Dysfunction \par \par Prostate cancer - Nan 6 prostate cancer from 11/2014. s/p external bean radiation. PSA stable. \par \par Given all above, Will need PET.\par Will also repeat labs and see patient back soon.\par \par time = 45 minutes.

## 2023-07-28 NOTE — HISTORY OF PRESENT ILLNESS
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 The lung cancer was diagnosed in May 2013, was resected as a T1 lesion and received 4 cycles of cisplatin and etoposide between June and September 2013. 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 Platelet count has been in the 90k/uL range and there is no evidence of bleeding or bruising. 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 I reviewed with radiology -- it showed a 3x2.5 cm mass in 2010 (retrospectively) and now is 6x4 cm. The lesion is homogeneous on scan and is not painful.\par \par I spoke with Dr. Cory cortez 3/24/15 who notes Nan 6 prostate cancer from 11/2014.  He ordered bone scan and will refer to Dr. Cardoza. Patient completed EBRT for 6 weeks in DEC 2015. Has no further diarrhea or blood in stool. Now eating well, weight is stable. \par \par 7/20/16 CT of chest c/w: IMPRESSION:  Emphysema  with  right  lower  lobe  prior  wedge  resection  and resolution  of  the  two  nodules  that  were  new  on  the  prior  study.  Left  lower lobe  4  mm  nodule  stable  from  several  prior  studies.  Continued  follow-up recommended.\par \par 2/10/17- CT of chest 2/4/17 without recurrence of disease. Patient reports he continues with arthralgias. Also continues with intermittent dizziness with position changes. He has not seen his cardiologist yet. Patient reports diarrhea has resolved and has been followed up with GI.\par \par 4/6/2018: Patient complain of bilateral lower lumbar pain and radiation down back of right leg from sciatic notch to lateral and anterior thigh. This makes it difficult to walk. Pain has been present for ~ 1 year but now getting worse. While in bed or sitting in chair, there is no pain. Otherwise he has no other new complaints. He had an MRI at [facility] on 3/2/18 ordered by Dr. Eddie Graves.  Various signal changes, arthritic changes and disk issues are seen. Last MRI was done at [facility] 10/2016. CAT 2/2018 of T/A/P showed stable and no evidence of relapse. Patient states he notices food stuck in throat intermittently over the past year. 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 After some "treatment" this has gotten better. Also he complain of right low back and hip pain. He had an injection to back and hip that did not help with Dr. Gutiérrez [phone]. \par \par 2/8/2019: today patient complain of pain in low back and right hip to anterior thigh. Also notes pain when stands in the left upper anterior thigh when he rises from standing. He saw Dr. Humphrey who is a physiatrist. XRAYs of right hip ordered. This was compared to CAT 11/2018 and showed some chronic changes. Patient advised to see a Neurosurgeon\par \par 5/17/2019: Mr Wan is here for follow up.  Today he is c/o dizziness which started once her got out his car.  After sitting down her feels better.  Mr Wan continues to complain about lower back pain.  He was seen by Dr Interiano (Ortho) who referred the patient to Dr Harmon.  He was seen by Dr Harmon (Spine) on 2/2019.  Patient had trigger point injections 4 times and PT with no help.  He recommended physical therapy and home exercise program with anti-inflammatory medication as needed.\par \par 11/19/2019: \par Low back pain - has been undergoing injections again with Dr. Allison in [place]. He states no benefit yet.\par Prostate cancer - Fisher 6 prostate cancer from 11/2014. s/p external bean radiation. PSA stable. Follows with Dr. Ray.\par \par 10/2/2020: Patient is here for an annual follow up.\par 1) Lower back pain:  Patient continues to complain of lower back.  It is worse during ambulation and better when sitting down.  He is using an "opioid" however does not remember which medication. \par 2) Neuroendocrine: CAT chest, abdomen/pelvis on 12/2019 demonstrate no evidence of recurrence.\par 3) Lymphoma: Denies fever, chills, adenopathy, change in weight or appetite.\par 4) Constipation: He is scheduled for colonoscopy/endoscopy with  Dr. Stefan Estevez (GI) in mid October 2020.\par 5) Social: He lives at home alone.  He is able to perform his routine/instrumental ADLs without restriction.\par \par 2/25/2022:\par 1) Lymphoma: Remains in remission for his low grade lymphoma.\par Last CT chest, abdomen/pelvis on 3/2021 revealed unchanged lymph nodes.\par 2) Neuroendocrine CA: Patient remains in remission for his stage one neuroendocrine CA.\par 3) Possible thrombus: Patient was evaluated by cardiology, Dr. Merchant.  US Duplex ordered.  Patient c/o chest discomfort and palpitation during exertion.  TTE and nuclear stress ordered.\par 4) COVID-19 vaccine: Patient received all three moderna vaccines.\par 5) Social: Patient lives home alone however has home attendant.\par \par 4/15/2022:\par Patient presents to the office today c/o of abdominal pain.\par The abdominal pain is located on LUQ that radiates towards the left back.\par As per patient, pain started for a couple of months.\par Patient rates the pain is 4/10 in severity.\par The pain is worse when touched.  Denies it currently.\par It is not associated with movements or meals.\par Denies nausea, vomiting, diarrhea, dysuria, flank pain or hematuria.\par His bowel movements are erratic and does not have a bowel movement every day.\par He is not using any stool softeners.\par \par 7/28/23\par Interp [id number] for [facility]\par \par LBP Patient states that has had chronic left LBP\par Increase pain when walks.\par He also notes pain in left sacral notch and radiates down right leg.\par He notes pain increase when goes to walk.\par \par Right shoulder blade pain for past 2 months.\par \par Lymphoma: Remains in remission for his low grade lymphoma.\par Last CT chest, abdomen/pelvis on 3/2021 revealed unchanged lymph nodes.\par \par Neuroendocrine CA: Patient remains in remission for his stage one neuroendocrine CA.\par Lung RUL mass new on 7/25/23 on CT ordered by Dr Malloy.\par Will need PET.\par \par CAD:  inferior wall MI s/p PCI, very mild LV Dysfunction \par \par Prostate cancer - Fisher 6 prostate cancer from 11/2014. s/p external bean radiation. PSA stable. Follows with Dr. Ray in past. [de-identified] : neuroendocrine, high grade [de-identified] : Petr Humphrey: [phone]\par Stefan Estevez, ()\par \par Ivon Ellis [phone]

## 2023-07-28 NOTE — PHYSICAL EXAM
[Restricted in physically strenuous activity but ambulatory and able to carry out work of a light or sedentary nature] : Status 1- Restricted in physically strenuous activity but ambulatory and able to carry out work of a light or sedentary nature, e.g., light house work, office work [Normal Male] : prostate smooth, symmetric with no modularity or induration [Normal] : affect appropriate [de-identified] : soft mass on right thigh overlying the lateral hip, non-tender, no eccyhmosis, 3x4 fbs, no change. [de-identified] : small 1-2 fb sucutaneous lesion right cheek

## 2023-07-31 ENCOUNTER — APPOINTMENT (OUTPATIENT)
Dept: NUCLEAR MEDICINE | Facility: IMAGING CENTER | Age: 83
End: 2023-07-31

## 2023-07-31 LAB
ALBUMIN SERPL ELPH-MCNC: 4.6 G/DL
ALP BLD-CCNC: 54 U/L
ALT SERPL-CCNC: 20 U/L
ANION GAP SERPL CALC-SCNC: 16 MMOL/L
APTT BLD: 32.3 SEC
AST SERPL-CCNC: 20 U/L
BILIRUB SERPL-MCNC: 0.7 MG/DL
BUN SERPL-MCNC: 25 MG/DL
CALCIUM SERPL-MCNC: 9.8 MG/DL
CEA SERPL-MCNC: 1.7 NG/ML
CHLORIDE SERPL-SCNC: 99 MMOL/L
CO2 SERPL-SCNC: 20 MMOL/L
CREAT SERPL-MCNC: 1.56 MG/DL
CRP SERPL-MCNC: 5 MG/L
EGFR: 44 ML/MIN/1.73M2
ESTIMATED AVERAGE GLUCOSE: 206 MG/DL
GLUCOSE SERPL-MCNC: 188 MG/DL
HBA1C MFR BLD HPLC: 8.8 %
INR PPP: 0.97 RATIO
LDH SERPL-CCNC: 144 U/L
POTASSIUM SERPL-SCNC: 5.1 MMOL/L
PROT SERPL-MCNC: 6.4 G/DL
PSA SERPL-MCNC: 0.5 NG/ML
PT BLD: 11.1 SEC
SODIUM SERPL-SCNC: 136 MMOL/L

## 2023-08-06 ENCOUNTER — OUTPATIENT (OUTPATIENT)
Dept: OUTPATIENT SERVICES | Facility: HOSPITAL | Age: 83
LOS: 1 days | End: 2023-08-06
Payer: MEDICARE

## 2023-08-06 ENCOUNTER — APPOINTMENT (OUTPATIENT)
Dept: NUCLEAR MEDICINE | Facility: IMAGING CENTER | Age: 83
End: 2023-08-06
Payer: MEDICARE

## 2023-08-06 DIAGNOSIS — C34.91 MALIGNANT NEOPLASM OF UNSPECIFIED PART OF RIGHT BRONCHUS OR LUNG: ICD-10-CM

## 2023-08-06 PROCEDURE — 78816 PET IMAGE W/CT FULL BODY: CPT | Mod: 26,PI,MH

## 2023-08-06 PROCEDURE — 78816 PET IMAGE W/CT FULL BODY: CPT

## 2023-08-06 PROCEDURE — A9552: CPT

## 2023-08-08 ENCOUNTER — RESULT REVIEW (OUTPATIENT)
Age: 83
End: 2023-08-08

## 2023-08-11 ENCOUNTER — RESULT REVIEW (OUTPATIENT)
Age: 83
End: 2023-08-11

## 2023-08-11 ENCOUNTER — OUTPATIENT (OUTPATIENT)
Dept: OUTPATIENT SERVICES | Facility: HOSPITAL | Age: 83
LOS: 1 days | End: 2023-08-11
Payer: MEDICARE

## 2023-08-11 ENCOUNTER — APPOINTMENT (OUTPATIENT)
Dept: ULTRASOUND IMAGING | Facility: IMAGING CENTER | Age: 83
End: 2023-08-11
Payer: MEDICARE

## 2023-08-11 DIAGNOSIS — C85.10 UNSPECIFIED B-CELL LYMPHOMA, UNSPECIFIED SITE: ICD-10-CM

## 2023-08-11 PROCEDURE — 88184 FLOWCYTOMETRY/ TC 1 MARKER: CPT

## 2023-08-11 PROCEDURE — 76942 ECHO GUIDE FOR BIOPSY: CPT | Mod: 26

## 2023-08-11 PROCEDURE — 38505 NEEDLE BIOPSY LYMPH NODES: CPT

## 2023-08-11 PROCEDURE — 38505 NEEDLE BIOPSY LYMPH NODES: CPT | Mod: RT

## 2023-08-11 PROCEDURE — 87205 SMEAR GRAM STAIN: CPT

## 2023-08-11 PROCEDURE — 88173 CYTOPATH EVAL FNA REPORT: CPT | Mod: 26

## 2023-08-11 PROCEDURE — 88189 FLOWCYTOMETRY/READ 16 & >: CPT

## 2023-08-11 PROCEDURE — 76942 ECHO GUIDE FOR BIOPSY: CPT

## 2023-08-11 PROCEDURE — 88305 TISSUE EXAM BY PATHOLOGIST: CPT

## 2023-08-11 PROCEDURE — 88342 IMHCHEM/IMCYTCHM 1ST ANTB: CPT

## 2023-08-11 PROCEDURE — 88360 TUMOR IMMUNOHISTOCHEM/MANUAL: CPT | Mod: 26

## 2023-08-11 PROCEDURE — 88360 TUMOR IMMUNOHISTOCHEM/MANUAL: CPT

## 2023-08-11 PROCEDURE — 88307 TISSUE EXAM BY PATHOLOGIST: CPT

## 2023-08-11 PROCEDURE — 88341 IMHCHEM/IMCYTCHM EA ADD ANTB: CPT

## 2023-08-11 PROCEDURE — 88172 CYTP DX EVAL FNA 1ST EA SITE: CPT

## 2023-08-11 PROCEDURE — 88342 IMHCHEM/IMCYTCHM 1ST ANTB: CPT | Mod: 26,59

## 2023-08-11 PROCEDURE — 88305 TISSUE EXAM BY PATHOLOGIST: CPT | Mod: 26

## 2023-08-11 PROCEDURE — 88185 FLOWCYTOMETRY/TC ADD-ON: CPT

## 2023-08-11 PROCEDURE — 88307 TISSUE EXAM BY PATHOLOGIST: CPT | Mod: 26

## 2023-08-11 PROCEDURE — 88173 CYTOPATH EVAL FNA REPORT: CPT

## 2023-08-11 PROCEDURE — 88341 IMHCHEM/IMCYTCHM EA ADD ANTB: CPT | Mod: 26,59

## 2023-08-15 ENCOUNTER — APPOINTMENT (OUTPATIENT)
Dept: THORACIC SURGERY | Facility: CLINIC | Age: 83
End: 2023-08-15
Payer: MEDICARE

## 2023-08-15 VITALS
WEIGHT: 170 LBS | HEIGHT: 69 IN | HEART RATE: 72 BPM | BODY MASS INDEX: 25.18 KG/M2 | RESPIRATION RATE: 17 BRPM | SYSTOLIC BLOOD PRESSURE: 167 MMHG | DIASTOLIC BLOOD PRESSURE: 82 MMHG | OXYGEN SATURATION: 98 %

## 2023-08-15 PROCEDURE — 99205 OFFICE O/P NEW HI 60 MIN: CPT

## 2023-08-15 RX ORDER — TAMSULOSIN HYDROCHLORIDE 0.4 MG/1
0.4 CAPSULE ORAL
Qty: 90 | Refills: 3 | Status: COMPLETED | COMMUNITY
Start: 2021-03-01 | End: 2023-08-15

## 2023-08-15 RX ORDER — TAMSULOSIN HYDROCHLORIDE 0.4 MG/1
0.4 CAPSULE ORAL
Qty: 90 | Refills: 3 | Status: COMPLETED | COMMUNITY
Start: 2019-08-05 | End: 2023-08-15

## 2023-08-15 NOTE — DATA REVIEWED
[FreeTextEntry1] : Independently reviewed the following: - CT Chest on 7/25/2023 - PET/CT on 8/6/2023

## 2023-08-15 NOTE — PHYSICAL EXAM
[Fully active, able to carry on all pre-disease performance without restriction] : Status 0 - Fully active, able to carry on all pre-disease performance without restriction [General Appearance - Alert] : alert [General Appearance - In No Acute Distress] : in no acute distress [Sclera] : the sclera and conjunctiva were normal [PERRL With Normal Accommodation] : pupils were equal in size, round, and reactive to light [Extraocular Movements] : extraocular movements were intact [Outer Ear] : the ears and nose were normal in appearance [Oropharynx] : the oropharynx was normal [Neck Appearance] : the appearance of the neck was normal [Neck Cervical Mass (___cm)] : no neck mass was observed [Jugular Venous Distention Increased] : there was no jugular-venous distention [Thyroid Diffuse Enlargement] : the thyroid was not enlarged [Thyroid Nodule] : there were no palpable thyroid nodules [Auscultation Breath Sounds / Voice Sounds] : lungs were clear to auscultation bilaterally [Heart Rate And Rhythm] : heart rate was normal and rhythm regular [Heart Sounds] : normal S1 and S2 [Heart Sounds Gallop] : no gallops [Murmurs] : no murmurs [Heart Sounds Pericardial Friction Rub] : no pericardial rub [Examination Of The Chest] : the chest was normal in appearance [Chest Visual Inspection Thoracic Asymmetry] : no chest asymmetry [Diminished Respiratory Excursion] : normal chest expansion [2+] : left 2+ [No Abnormalities] : the abdominal aorta was not enlarged and no bruit was heard [Breast Appearance] : normal in appearance [Breast Palpation Mass] : no palpable masses [Bowel Sounds] : normal bowel sounds [Abdomen Soft] : soft [Abdomen Tenderness] : non-tender [Abdomen Mass (___ Cm)] : no abdominal mass palpated [Cervical Lymph Nodes Enlarged Anterior Bilaterally] : anterior cervical [Cervical Lymph Nodes Enlarged Posterior Bilaterally] : posterior cervical [Supraclavicular Lymph Nodes Enlarged Bilaterally] : supraclavicular [No CVA Tenderness] : no ~M costovertebral angle tenderness [No Spinal Tenderness] : no spinal tenderness [Nail Clubbing] : no clubbing  or cyanosis of the fingernails [Abnormal Walk] : normal gait [Musculoskeletal - Swelling] : no joint swelling seen [Motor Tone] : muscle strength and tone were normal [Skin Color & Pigmentation] : normal skin color and pigmentation [Skin Turgor] : normal skin turgor [] : no rash [Deep Tendon Reflexes (DTR)] : deep tendon reflexes were 2+ and symmetric [Sensation] : the sensory exam was normal to light touch and pinprick [No Focal Deficits] : no focal deficits [Oriented To Time, Place, And Person] : oriented to person, place, and time [Affect] : the affect was normal [Impaired Insight] : insight and judgment were intact [Right Carotid Bruit] : no bruit heard over the right carotid [Left Carotid Bruit] : no bruit heard over the left carotid [Right Femoral Bruit] : no bruit heard over the right femoral artery [Left Femoral Bruit] : no bruit heard over the left femoral artery [FreeTextEntry1] : Deferred

## 2023-08-15 NOTE — ASSESSMENT
[FreeTextEntry1] : Mr. 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 Chip Carter.    - 5/2013 s/p right VATS, Right lower lobe wedge resection w/ MLND (5/2013) w/ Dr. Mcintyre Not felt to be a suitable candidate for lobectomy due to diminished lung functions. 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 - New solid, irregularly marginated 1.5 cm nodule in the right upper lobe (series 2, image 58); a subsegmental bronchus extends into the lesion.  - Other small nodules, including a 6 mm posterior RUL nodule (image 42) and 4 mm superior LLL nodule (image 43) remain unchanged. - Status post right axillary abdiel dissection, with unchanged right axillary lymph nodes measuring up to 1.4 cm short axis. No left axillary, mediastinal or hilar lymphadenopathy. Unremarkable thyroid. - s/p Cholecystectomy. Stable 1.6 cm left adrenal nodule. - No aggressive bone lesions. Right posterior rib deformities may be related to prior thoracotomy. 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  PET/CT on 8/6/2023:  - FDG avid right parotid nodule, 1.1 x 0.6 cm, SUV 4 (image 57) - 5-10 FDG avid right axillary lymph nodes, with index node 1.8 x 1.5 cm, SUV 7.6 (image 101) ; FDG avid subcarinal nodes, with index node in the left, 1.4 x 0.8 cm, SUV 6 (image 120) - Symmetrical low-level activity in the tracey. FDG avid paravertebral soft tissue, for example a focus measuring 2.7 x 1.7 cm superior to the aortic arch, SUV 6, image 102. More inferiorly there is FDG avid left paravertebral soft tissue, difficult to delineate on CT, SUV 5, image 139. - FDG avid RUL nodule, 1.5 x 0.8 cm, SUV 2.8 (image 118) similar to recent CT. - Postsurgical change in the RLL. - Similar 6 mm posterior RUL nodule (image 108)  - Similar 4 mm superior LLL (image 43) - Emphysema.  - FDG avid right inguinal lymph node with a fatty hilum, 1.3 x 1.2 cm, SUV 4 (image 264); previously 0.9 x 0.8 cm and not FDG avid. FDG avid soft tissue anterior to the inferior lumbar spine and sacrum, difficult to delineate on CT, SUV 6.5 (image 218)  - Coronary and large vessel calcifications. - Mildly increased uptake in a left anterior rib costochondral junction, without underlying CT abnormality, SUV 2.6 (image 140)  - Mild increased uptake in 2 left posterior ribs (image 128 and 131) Increased uptake in a left upper lateral rib (image 100) - Intramuscular lipoma adjacent to the right scapula, no FDG avidity (image 106) - Intramuscular lipoma in the right lateral chest wall, no FDG avidity (image 135) Right thigh intramuscular lipoma, no FDG avidity (image 240)  Of Note: experiencing pain in right shoulder blade and bilateral lower back and spine  Patient is US bx of right axillary LN on 08/11/2023. Path is pending.   I have reviewed the patient's medical records and diagnostic images at time of this office consultation and have made the following recommendation: 1. CT chest reviewed and explained to patient, FDG avid RUL nodule, we discussed Flexible Bronchoscopy, Endobronchial ultrasound biopsy, Navigational Bronchoscopy for diagnostic purpose. Risks and benefits and alternatives explained to patient, all questions answered, patient agreed to proceed with procedure.  2. CT chest w/o contrast (Navigational Bronchoscopy protocol) 3. Pending path for axillary biopsy.  4. Hold Plavix 5 days prior to procedure.  5. Cardiac clearance and PST.    I, MARGARET MazariegosIM, personally performed the evaluation and management (E/M) services for this new patient.  That E/M includes conducting the initial examination, assessing all conditions, and establishing the plan of care.  Today, my ACP, Khushboo Valderrama, ANP-C, was here to observe my evaluation and management services for this patient to be followed going forward.

## 2023-08-15 NOTE — HISTORY OF PRESENT ILLNESS
[FreeTextEntry1] : Mr. 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 Chip Carter.    - 5/2013 s/p right VATS, Right lower lobe wedge resection w/ MLND (5/2013) w/ Dr. Mcintyre Not felt to be a suitable candidate for lobectomy due to diminished lung functions. 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 - New solid, irregularly marginated 1.5 cm nodule in the right upper lobe (series 2, image 58); a subsegmental bronchus extends into the lesion.  - Other small nodules, including a 6 mm posterior RUL nodule (image 42) and 4 mm superior LLL nodule (image 43) remain unchanged. - Status post right axillary abdiel dissection, with unchanged right axillary lymph nodes measuring up to 1.4 cm short axis. No left axillary, mediastinal or hilar lymphadenopathy. Unremarkable thyroid. - s/p Cholecystectomy. Stable 1.6 cm left adrenal nodule. - No aggressive bone lesions. Right posterior rib deformities may be related to prior thoracotomy. 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  PET/CT on 8/6/2023:  - FDG avid right parotid nodule, 1.1 x 0.6 cm, SUV 4 (image 57) - 5-10 FDG avid right axillary lymph nodes, with index node 1.8 x 1.5 cm, SUV 7.6 (image 101) ; FDG avid subcarinal nodes, with index node in the left, 1.4 x 0.8 cm, SUV 6 (image 120) - Symmetrical low-level activity in the tracey. FDG avid paravertebral soft tissue, for example a focus measuring 2.7 x 1.7 cm superior to the aortic arch, SUV 6, image 102. More inferiorly there is FDG avid left paravertebral soft tissue, difficult to delineate on CT, SUV 5, image 139. - FDG avid RUL nodule, 1.5 x 0.8 cm, SUV 2.8 (image 118) similar to recent CT. - Postsurgical change in the RLL. - Similar 6 mm posterior RUL nodule (image 108)  - Similar 4 mm superior LLL (image 43) - Emphysema.  - FDG avid right inguinal lymph node with a fatty hilum, 1.3 x 1.2 cm, SUV 4 (image 264); previously 0.9 x 0.8 cm and not FDG avid. FDG avid soft tissue anterior to the inferior lumbar spine and sacrum, difficult to delineate on CT, SUV 6.5 (image 218)  - Coronary and large vessel calcifications. - Mildly increased uptake in a left anterior rib costochondral junction, without underlying CT abnormality, SUV 2.6 (image 140)  - Mild increased uptake in 2 left posterior ribs (image 128 and 131) Increased uptake in a left upper lateral rib (image 100) - Intramuscular lipoma adjacent to the right scapula, no FDG avidity (image 106) - Intramuscular lipoma in the right lateral chest wall, no FDG avidity (image 135) Right thigh intramuscular lipoma, no FDG avidity (image 240)  Of Note: experiencing pain in right shoulder blade and bilateral lower back and spine  Patient is US bx of right axillary LN on 08/11/2023. Path is pending.   Patient presents today for CTSX consultation. Patient denies shortness of breath, cough, chest pain, fever, chills, loss of appetite, weight loss, or hemoptysis.

## 2023-08-16 LAB — NON-GYNECOLOGICAL CYTOLOGY STUDY: SIGNIFICANT CHANGE UP

## 2023-08-17 LAB — TM INTERPRETATION: SIGNIFICANT CHANGE UP

## 2023-08-22 ENCOUNTER — OUTPATIENT (OUTPATIENT)
Dept: OUTPATIENT SERVICES | Facility: HOSPITAL | Age: 83
LOS: 1 days | End: 2023-08-22

## 2023-08-22 VITALS
TEMPERATURE: 97 F | HEART RATE: 73 BPM | DIASTOLIC BLOOD PRESSURE: 85 MMHG | WEIGHT: 162.04 LBS | OXYGEN SATURATION: 96 % | SYSTOLIC BLOOD PRESSURE: 150 MMHG | HEIGHT: 69 IN | RESPIRATION RATE: 16 BRPM

## 2023-08-22 DIAGNOSIS — E11.9 TYPE 2 DIABETES MELLITUS WITHOUT COMPLICATIONS: ICD-10-CM

## 2023-08-22 DIAGNOSIS — D69.6 THROMBOCYTOPENIA, UNSPECIFIED: ICD-10-CM

## 2023-08-22 DIAGNOSIS — R91.8 OTHER NONSPECIFIC ABNORMAL FINDING OF LUNG FIELD: ICD-10-CM

## 2023-08-22 DIAGNOSIS — I25.10 ATHEROSCLEROTIC HEART DISEASE OF NATIVE CORONARY ARTERY WITHOUT ANGINA PECTORIS: ICD-10-CM

## 2023-08-22 DIAGNOSIS — Z91.89 OTHER SPECIFIED PERSONAL RISK FACTORS, NOT ELSEWHERE CLASSIFIED: ICD-10-CM

## 2023-08-22 DIAGNOSIS — I10 ESSENTIAL (PRIMARY) HYPERTENSION: ICD-10-CM

## 2023-08-22 DIAGNOSIS — J43.9 EMPHYSEMA, UNSPECIFIED: ICD-10-CM

## 2023-08-22 NOTE — H&P PST ADULT - PROBLEM SELECTOR PLAN 3
Pt reports stents over 10 years old. Pt reports he stopped taking ASA about 9 years ago because he could not tolerate.  Pt was instructed to hold Clopidogrel 5 days preop, Last dose on 8/25, Pt stated understanding.    Cardiac evaluation in chart.

## 2023-08-22 NOTE — H&P PST ADULT - NSICDXPASTSURGICALHX_GEN_ALL_CORE_FT
PAST SURGICAL HISTORY:  s/p Angioplasty with Stent     S/P laparoscopic cholecystectomy     Status post lung surgery

## 2023-08-22 NOTE — H&P PST ADULT - MUSCULOSKELETAL
ambulates with a cane/normal/ROM intact/strength 5/5 bilateral upper extremities/strength 5/5 bilateral lower extremities/abnormal gait details…

## 2023-08-22 NOTE — H&P PST ADULT - NSICDXPASTMEDICALHX_GEN_ALL_CORE_FT
PAST MEDICAL HISTORY:  Acute anterior wall MI     CAD (coronary artery disease)     Diabetes     H/O emphysema     H/O thrombocytopenia     History of renal insufficiency     HTN (hypertension)     Hyperlipidemia     Lipoma     Lung cancer     Lymphoma     Neuroendocrine cancer     Non-Hodgkin lymphoma     PAD (peripheral artery disease)     Prostate cancer     Pulmonary nodule, right     Stented coronary artery 2 stents, [id number]?

## 2023-08-22 NOTE — H&P PST ADULT - PROBLEM SELECTOR PLAN 1
Patient tentatively scheduled for flexible bronchoscopy, endobronchial ultrasound guided biopsy with regular probe, robotic monarch navigational bronchoscopy with radial probe for 8/31/23. Pre-op instructions provided. Pt given verbal and written instructions with teach back on pepcid. Pt verbalized understanding with return demonstration.     CBC CMP COAGS A1C on 7/28 - reports in chart.  EKG 5/2023 copy in chart

## 2023-08-22 NOTE — H&P PST ADULT - HEIGHT IN FEET
Pt called and notified that she can wear any deodorant she likes. Pt reports site healing well. Pt only concern is she feels some tingling around the area of the surgical incision still. Pt advised that author will consult with provider. Pt reports okay to leave detailed messages on cell phone.   5

## 2023-08-22 NOTE — H&P PST ADULT - HISTORY OF PRESENT ILLNESS
84 y/o male Poor historian, PMH former smoker, w/ hx of Type 2 DM, PAD, HTN, HLD, non-hodgkins lymphoma diagnosed via bone biopsy (s/p Rituxan 2007 to 2009), High grade neuroendocrine lung cancer (2013) s/p adjuvant chemo September, 2013; Prostate cancer (11/2014) Completed External Bean RT December 2015, CAD - inferior wall MI s/p PCI, very mild LV Dysfunction; home oxygen as needed at night, Throombocytopenia presents to presurgical testing with diagnosis of other nonspecific abnormal finding lung field. Pt underwent right VATS, Right lower lobe wedge resection w/ MLND (5/2013) w/ Dr. Mcintyre. Pt with a new right upper lung nodule on surveillance CT scan. Pt is scheduled for a flexible bronchoscopy, endobronchial ultrasound guided biopsy with regular probe, robotic monarch navigational bronchoscopy with radial probe.

## 2023-08-29 ENCOUNTER — TRANSCRIPTION ENCOUNTER (OUTPATIENT)
Age: 83
End: 2023-08-29

## 2023-08-29 NOTE — ASU PATIENT PROFILE, ADULT - FALL HARM RISK - HARM RISK INTERVENTIONS

## 2023-08-29 NOTE — ASU PATIENT PROFILE, ADULT - NSICDXPASTMEDICALHX_GEN_ALL_CORE_FT
PAST MEDICAL HISTORY:  Acute anterior wall MI     CAD (coronary artery disease)     Diabetes     H/O emphysema     H/O thrombocytopenia     History of renal insufficiency     HTN (hypertension)     Hyperlipidemia     Lipoma     Lung cancer     Lymphoma     Neuroendocrine cancer     Non-Hodgkin lymphoma     PAD (peripheral artery disease)     Prostate cancer     Pulmonary nodule, right     Stented coronary artery 2 stents, [id number]?

## 2023-08-29 NOTE — ASU PATIENT PROFILE, ADULT - NSCAFFEINEWITH_GEN_ALL_CORE_SD
Problem: MOBILITY - ADULT  Goal: Maintain or return to baseline ADL function  Description: INTERVENTIONS:  -  Assess patient's ability to carry out ADLs; assess patient's baseline for ADL function and identify physical deficits which impact ability to perform ADLs (bathing, care of mouth/teeth, toileting, grooming, dressing, etc )  - Assess/evaluate cause of self-care deficits   - Assess range of motion  - Assess patient's mobility; develop plan if impaired  - Assess patient's need for assistive devices and provide as appropriate  - Encourage maximum independence but intervene and supervise when necessary  - Involve family in performance of ADLs  - Assess for home care needs following discharge   - Consider OT consult to assist with ADL evaluation and planning for discharge  - Provide patient education as appropriate  Outcome: Progressing  Goal: Maintains/Returns to pre admission functional level  Description: INTERVENTIONS:  - Perform BMAT or MOVE assessment daily    - Set and communicate daily mobility goal to care team and patient/family/caregiver  - Collaborate with rehabilitation services on mobility goals if consulted  - Perform Range of Motion 2 times a day  - Reposition patient every 2 hours    - Dangle patient 2 times a day  - Stand patient 2 times a day  - Ambulate patient 2 times a day  - Out of bed to chair 2 times a day   - Out of bed for meals 2 times a day  - Out of bed for toileting  - Record patient progress and toleration of activity level   Outcome: Progressing     Problem: RESPIRATORY - ADULT  Goal: Achieves optimal ventilation and oxygenation  Description: INTERVENTIONS:  - Assess for changes in respiratory status  - Assess for changes in mentation and behavior  - Position to facilitate oxygenation and minimize respiratory effort  - Oxygen administered by appropriate delivery if ordered  - Initiate smoking cessation education as indicated  - Encourage broncho-pulmonary hygiene including cough, deep breathe, Incentive Spirometry  - Assess the need for suctioning and aspirate as needed  - Assess and instruct to report SOB or any respiratory difficulty  - Respiratory Therapy support as indicated  Outcome: Progressing     Problem: SAFETY ADULT  Goal: Maintain or return to baseline ADL function  Description: INTERVENTIONS:  -  Assess patient's ability to carry out ADLs; assess patient's baseline for ADL function and identify physical deficits which impact ability to perform ADLs (bathing, care of mouth/teeth, toileting, grooming, dressing, etc )  - Assess/evaluate cause of self-care deficits   - Assess range of motion  - Assess patient's mobility; develop plan if impaired  - Assess patient's need for assistive devices and provide as appropriate  - Encourage maximum independence but intervene and supervise when necessary  - Involve family in performance of ADLs  - Assess for home care needs following discharge   - Consider OT consult to assist with ADL evaluation and planning for discharge  - Provide patient education as appropriate  Outcome: Progressing  Goal: Maintains/Returns to pre admission functional level  Description: INTERVENTIONS:  - Perform BMAT or MOVE assessment daily    - Set and communicate daily mobility goal to care team and patient/family/caregiver  - Collaborate with rehabilitation services on mobility goals if consulted  - Perform Range of Motion 2 times a day  - Reposition patient every 2 hours    - Dangle patient 2 times a day  - Stand patient 2 times a day  - Ambulate patient 2 times a day  - Out of bed to chair 2 times a day   - Out of bed for meals 2 times a day  - Out of bed for toileting  - Record patient progress and toleration of activity level   Outcome: Progressing  Goal: Patient will remain free of falls  Description: INTERVENTIONS:  - Educate patient/family on patient safety including physical limitations  - Instruct patient to call for assistance with activity   - Consult OT/PT to assist with strengthening/mobility   - Keep Call bell within reach  - Keep bed low and locked with side rails adjusted as appropriate  - Keep care items and personal belongings within reach  - Initiate and maintain comfort rounds  - Make Fall Risk Sign visible to staff  - Offer Toileting every 2 Hours, in advance of need  - Initiate/Maintain bed alarm  - Obtain necessary fall risk management equipment:   - Apply yellow socks and bracelet for high fall risk patients  - Consider moving patient to room near nurses station  Outcome: Progressing     Problem: DISCHARGE PLANNING  Goal: Discharge to home or other facility with appropriate resources  Description: INTERVENTIONS:  - Identify barriers to discharge w/patient and caregiver  - Arrange for needed discharge resources and transportation as appropriate  - Identify discharge learning needs (meds, wound care, etc )  - Arrange for interpretive services to assist at discharge as needed  - Refer to Case Management Department for coordinating discharge planning if the patient needs post-hospital services based on physician/advanced practitioner order or complex needs related to functional status, cognitive ability, or social support system  Outcome: Progressing     Problem: Knowledge Deficit  Goal: Patient/family/caregiver demonstrates understanding of disease process, treatment plan, medications, and discharge instructions  Description: Complete learning assessment and assess knowledge base    Interventions:  - Provide teaching at level of understanding  - Provide teaching via preferred learning methods  Outcome: Progressing denies

## 2023-08-30 ENCOUNTER — INPATIENT (INPATIENT)
Facility: HOSPITAL | Age: 83
LOS: 0 days | Discharge: HOME | End: 2023-08-31
Attending: THORACIC SURGERY (CARDIOTHORACIC VASCULAR SURGERY) | Admitting: THORACIC SURGERY (CARDIOTHORACIC VASCULAR SURGERY)
Payer: MEDICARE

## 2023-08-30 ENCOUNTER — APPOINTMENT (OUTPATIENT)
Dept: THORACIC SURGERY | Facility: HOSPITAL | Age: 83
End: 2023-08-30

## 2023-08-30 ENCOUNTER — RESULT REVIEW (OUTPATIENT)
Age: 83
End: 2023-08-30

## 2023-08-30 ENCOUNTER — TRANSCRIPTION ENCOUNTER (OUTPATIENT)
Age: 83
End: 2023-08-30

## 2023-08-30 VITALS
HEIGHT: 69 IN | WEIGHT: 162.04 LBS | TEMPERATURE: 98 F | SYSTOLIC BLOOD PRESSURE: 166 MMHG | HEART RATE: 81 BPM | RESPIRATION RATE: 18 BRPM | DIASTOLIC BLOOD PRESSURE: 88 MMHG | OXYGEN SATURATION: 99 %

## 2023-08-30 DIAGNOSIS — R91.8 OTHER NONSPECIFIC ABNORMAL FINDING OF LUNG FIELD: ICD-10-CM

## 2023-08-30 LAB
ANION GAP SERPL CALC-SCNC: 8 MMOL/L — SIGNIFICANT CHANGE UP (ref 7–14)
BASE EXCESS BLDA CALC-SCNC: -3.4 MMOL/L — LOW (ref -2–3)
BASOPHILS # BLD AUTO: 0.03 K/UL — SIGNIFICANT CHANGE UP (ref 0–0.2)
BASOPHILS NFR BLD AUTO: 0.3 % — SIGNIFICANT CHANGE UP (ref 0–2)
BLD GP AB SCN SERPL QL: NEGATIVE — SIGNIFICANT CHANGE UP
BUN SERPL-MCNC: 16 MG/DL — SIGNIFICANT CHANGE UP (ref 7–23)
CALCIUM SERPL-MCNC: 8.8 MG/DL — SIGNIFICANT CHANGE UP (ref 8.4–10.5)
CHLORIDE SERPL-SCNC: 103 MMOL/L — SIGNIFICANT CHANGE UP (ref 98–107)
CO2 BLDA-SCNC: 22 MMOL/L — SIGNIFICANT CHANGE UP (ref 19–24)
CO2 SERPL-SCNC: 23 MMOL/L — SIGNIFICANT CHANGE UP (ref 22–31)
CREAT SERPL-MCNC: 1.37 MG/DL — HIGH (ref 0.5–1.3)
EGFR: 51 ML/MIN/1.73M2 — LOW
EOSINOPHIL # BLD AUTO: 0.29 K/UL — SIGNIFICANT CHANGE UP (ref 0–0.5)
EOSINOPHIL NFR BLD AUTO: 2.7 % — SIGNIFICANT CHANGE UP (ref 0–6)
GAS PNL BLDA: SIGNIFICANT CHANGE UP
GLUCOSE BLDC GLUCOMTR-MCNC: 169 MG/DL — HIGH (ref 70–99)
GLUCOSE BLDC GLUCOMTR-MCNC: 173 MG/DL — HIGH (ref 70–99)
GLUCOSE BLDC GLUCOMTR-MCNC: 180 MG/DL — HIGH (ref 70–99)
GLUCOSE BLDC GLUCOMTR-MCNC: 223 MG/DL — HIGH (ref 70–99)
GLUCOSE SERPL-MCNC: 253 MG/DL — HIGH (ref 70–99)
HCO3 BLDA-SCNC: 21 MMOL/L — SIGNIFICANT CHANGE UP (ref 21–28)
HCT VFR BLD CALC: 41.6 % — SIGNIFICANT CHANGE UP (ref 39–50)
HGB BLD-MCNC: 14.6 G/DL — SIGNIFICANT CHANGE UP (ref 13–17)
HOROWITZ INDEX BLDA+IHG-RTO: 40 — SIGNIFICANT CHANGE UP
IANC: 8.31 K/UL — HIGH (ref 1.8–7.4)
IMM GRANULOCYTES NFR BLD AUTO: 0.8 % — SIGNIFICANT CHANGE UP (ref 0–0.9)
LYMPHOCYTES # BLD AUTO: 1.1 K/UL — SIGNIFICANT CHANGE UP (ref 1–3.3)
LYMPHOCYTES # BLD AUTO: 10.4 % — LOW (ref 13–44)
MCHC RBC-ENTMCNC: 31.7 PG — SIGNIFICANT CHANGE UP (ref 27–34)
MCHC RBC-ENTMCNC: 35.1 GM/DL — SIGNIFICANT CHANGE UP (ref 32–36)
MCV RBC AUTO: 90.2 FL — SIGNIFICANT CHANGE UP (ref 80–100)
MONOCYTES # BLD AUTO: 0.81 K/UL — SIGNIFICANT CHANGE UP (ref 0–0.9)
MONOCYTES NFR BLD AUTO: 7.6 % — SIGNIFICANT CHANGE UP (ref 2–14)
NEUTROPHILS # BLD AUTO: 8.31 K/UL — HIGH (ref 1.8–7.4)
NEUTROPHILS NFR BLD AUTO: 78.2 % — HIGH (ref 43–77)
NRBC # BLD: 0 /100 WBCS — SIGNIFICANT CHANGE UP (ref 0–0)
NRBC # FLD: 0 K/UL — SIGNIFICANT CHANGE UP (ref 0–0)
PCO2 BLDA: 37 MMHG — SIGNIFICANT CHANGE UP (ref 35–48)
PH BLDA: 7.37 — SIGNIFICANT CHANGE UP (ref 7.35–7.45)
PLATELET # BLD AUTO: 113 K/UL — LOW (ref 150–400)
PO2 BLDA: 96 MMHG — SIGNIFICANT CHANGE UP (ref 83–108)
POTASSIUM SERPL-MCNC: 4.9 MMOL/L — SIGNIFICANT CHANGE UP (ref 3.5–5.3)
POTASSIUM SERPL-SCNC: 4.9 MMOL/L — SIGNIFICANT CHANGE UP (ref 3.5–5.3)
RBC # BLD: 4.61 M/UL — SIGNIFICANT CHANGE UP (ref 4.2–5.8)
RBC # FLD: 13 % — SIGNIFICANT CHANGE UP (ref 10.3–14.5)
RH IG SCN BLD-IMP: POSITIVE — SIGNIFICANT CHANGE UP
SAO2 % BLDA: 98 % — SIGNIFICANT CHANGE UP (ref 94–98)
SODIUM SERPL-SCNC: 134 MMOL/L — LOW (ref 135–145)
WBC # BLD: 10.62 K/UL — HIGH (ref 3.8–10.5)
WBC # FLD AUTO: 10.62 K/UL — HIGH (ref 3.8–10.5)

## 2023-08-30 PROCEDURE — 31629 BRONCHOSCOPY/NEEDLE BX EACH: CPT

## 2023-08-30 PROCEDURE — 71045 X-RAY EXAM CHEST 1 VIEW: CPT | Mod: 26

## 2023-08-30 PROCEDURE — 31627 NAVIGATIONAL BRONCHOSCOPY: CPT

## 2023-08-30 PROCEDURE — 99291 CRITICAL CARE FIRST HOUR: CPT

## 2023-08-30 PROCEDURE — 88173 CYTOPATH EVAL FNA REPORT: CPT | Mod: 26

## 2023-08-30 PROCEDURE — 31624 DX BRONCHOSCOPE/LAVAGE: CPT

## 2023-08-30 PROCEDURE — 88305 TISSUE EXAM BY PATHOLOGIST: CPT | Mod: 26

## 2023-08-30 RX ORDER — DEXTROSE 50 % IN WATER 50 %
15 SYRINGE (ML) INTRAVENOUS ONCE
Refills: 0 | Status: DISCONTINUED | OUTPATIENT
Start: 2023-08-30 | End: 2023-08-31

## 2023-08-30 RX ORDER — HYDRALAZINE HCL 50 MG
5 TABLET ORAL EVERY 4 HOURS
Refills: 0 | Status: DISCONTINUED | OUTPATIENT
Start: 2023-08-30 | End: 2023-08-31

## 2023-08-30 RX ORDER — DEXTROSE 50 % IN WATER 50 %
25 SYRINGE (ML) INTRAVENOUS ONCE
Refills: 0 | Status: DISCONTINUED | OUTPATIENT
Start: 2023-08-30 | End: 2023-08-31

## 2023-08-30 RX ORDER — LABETALOL HCL 100 MG
10 TABLET ORAL EVERY 4 HOURS
Refills: 0 | Status: DISCONTINUED | OUTPATIENT
Start: 2023-08-30 | End: 2023-08-30

## 2023-08-30 RX ORDER — LABETALOL HCL 100 MG
10 TABLET ORAL ONCE
Refills: 0 | Status: COMPLETED | OUTPATIENT
Start: 2023-08-30 | End: 2023-08-30

## 2023-08-30 RX ORDER — SODIUM CHLORIDE 9 MG/ML
1000 INJECTION, SOLUTION INTRAVENOUS
Refills: 0 | Status: DISCONTINUED | OUTPATIENT
Start: 2023-08-30 | End: 2023-08-31

## 2023-08-30 RX ORDER — FENTANYL CITRATE 50 UG/ML
25 INJECTION INTRAVENOUS ONCE
Refills: 0 | Status: DISCONTINUED | OUTPATIENT
Start: 2023-08-30 | End: 2023-08-30

## 2023-08-30 RX ORDER — INSULIN LISPRO 100/ML
VIAL (ML) SUBCUTANEOUS EVERY 6 HOURS
Refills: 0 | Status: DISCONTINUED | OUTPATIENT
Start: 2023-08-30 | End: 2023-08-31

## 2023-08-30 RX ORDER — DEXTROSE 50 % IN WATER 50 %
12.5 SYRINGE (ML) INTRAVENOUS ONCE
Refills: 0 | Status: DISCONTINUED | OUTPATIENT
Start: 2023-08-30 | End: 2023-08-31

## 2023-08-30 RX ORDER — LABETALOL HCL 100 MG
10 TABLET ORAL EVERY 6 HOURS
Refills: 0 | Status: COMPLETED | OUTPATIENT
Start: 2023-08-30 | End: 2023-08-31

## 2023-08-30 RX ORDER — HYDRALAZINE HCL 50 MG
10 TABLET ORAL ONCE
Refills: 0 | Status: COMPLETED | OUTPATIENT
Start: 2023-08-30 | End: 2023-08-30

## 2023-08-30 RX ORDER — PROPOFOL 10 MG/ML
5 INJECTION, EMULSION INTRAVENOUS
Qty: 500 | Refills: 0 | Status: DISCONTINUED | OUTPATIENT
Start: 2023-08-30 | End: 2023-08-30

## 2023-08-30 RX ORDER — ACETAMINOPHEN 500 MG
1000 TABLET ORAL ONCE
Refills: 0 | Status: DISCONTINUED | OUTPATIENT
Start: 2023-08-30 | End: 2023-08-31

## 2023-08-30 RX ORDER — PROPOFOL 10 MG/ML
50 INJECTION, EMULSION INTRAVENOUS
Qty: 1000 | Refills: 0 | Status: DISCONTINUED | OUTPATIENT
Start: 2023-08-30 | End: 2023-08-30

## 2023-08-30 RX ORDER — DEXMEDETOMIDINE HYDROCHLORIDE IN 0.9% SODIUM CHLORIDE 4 UG/ML
0.5 INJECTION INTRAVENOUS
Qty: 400 | Refills: 0 | Status: DISCONTINUED | OUTPATIENT
Start: 2023-08-30 | End: 2023-08-30

## 2023-08-30 RX ORDER — ATORVASTATIN CALCIUM 80 MG/1
40 TABLET, FILM COATED ORAL AT BEDTIME
Refills: 0 | Status: DISCONTINUED | OUTPATIENT
Start: 2023-08-30 | End: 2023-08-31

## 2023-08-30 RX ORDER — CARVEDILOL PHOSPHATE 80 MG/1
6.25 CAPSULE, EXTENDED RELEASE ORAL EVERY 12 HOURS
Refills: 0 | Status: DISCONTINUED | OUTPATIENT
Start: 2023-08-30 | End: 2023-08-30

## 2023-08-30 RX ORDER — GLUCAGON INJECTION, SOLUTION 0.5 MG/.1ML
1 INJECTION, SOLUTION SUBCUTANEOUS ONCE
Refills: 0 | Status: DISCONTINUED | OUTPATIENT
Start: 2023-08-30 | End: 2023-08-31

## 2023-08-30 RX ORDER — CHLORHEXIDINE GLUCONATE 213 G/1000ML
15 SOLUTION TOPICAL EVERY 12 HOURS
Refills: 0 | Status: DISCONTINUED | OUTPATIENT
Start: 2023-08-30 | End: 2023-08-30

## 2023-08-30 RX ADMIN — Medication 10 MILLIGRAM(S): at 12:14

## 2023-08-30 RX ADMIN — SODIUM CHLORIDE 75 MILLILITER(S): 9 INJECTION, SOLUTION INTRAVENOUS at 20:29

## 2023-08-30 RX ADMIN — DEXMEDETOMIDINE HYDROCHLORIDE IN 0.9% SODIUM CHLORIDE 9.19 MICROGRAM(S)/KG/HR: 4 INJECTION INTRAVENOUS at 10:28

## 2023-08-30 RX ADMIN — CHLORHEXIDINE GLUCONATE 15 MILLILITER(S): 213 SOLUTION TOPICAL at 18:20

## 2023-08-30 RX ADMIN — Medication 10 MILLIGRAM(S): at 10:25

## 2023-08-30 RX ADMIN — Medication 10 MILLIGRAM(S): at 09:54

## 2023-08-30 RX ADMIN — PROPOFOL 22 MICROGRAM(S)/KG/MIN: 10 INJECTION, EMULSION INTRAVENOUS at 11:53

## 2023-08-30 RX ADMIN — FENTANYL CITRATE 25 MICROGRAM(S): 50 INJECTION INTRAVENOUS at 09:54

## 2023-08-30 NOTE — BRIEF OPERATIVE NOTE - OPERATION/FINDINGS
s/p flexible bronchoscopy and monarch navigational bronchoscopy with biopsy of RUL nodule. Extensive bleeding noted from the superior segment s/p epinephrine and cold saline flushes with control of bleeding. EBL ~350-400cc.

## 2023-08-30 NOTE — PATIENT PROFILE ADULT - FALL HARM RISK - HARM RISK INTERVENTIONS

## 2023-08-30 NOTE — PROGRESS NOTE ADULT - SUBJECTIVE AND OBJECTIVE BOX
KATIUSKA GIRON      83y   Male   [id number]         penicillin (Urticaria; Rash)             Daily Height in cm: 175.26 (30 Aug 2023 06:39)    Daily Drug Dosing Weight  Height (cm): 175.3 (30 Aug 2023 06:39)  Weight (kg): 73.482 (30 Aug 2023 06:39)  BMI (kg/m2): 23.9 (30 Aug 2023 06:39)  BSA (m2): 1.89 (30 Aug 2023 06:39)    HPI:      CHIEF COMPLAINT: Follow up in ICU  for postoperative care of patient who is s/p lung surgery    Procedure: Flexible bronchoscopy and [facility] navigational bronchoscopy with biopsy of RUL nodule. Extensive bleeding noted from the superior segment s/p epinephrine and cold saline flushes with control of bleeding. EBL ~350-400cc.                       Issues:              Lung nodule  Intra-op bleeding              Postop pain              Chest tube in place  Diabetes  HTN (hypertension)  Non-Hodgkin lymphoma  CAD (coronary artery disease) s/p Stented coronary artery 2 stents, [id number]?  Hyperlipidemia  Lung cancer s/p lung surgery  Neuroendocrine cancer  Prostate cancer  History of renal insufficiency  Lymphoma  PAD (peripheral artery disease)  H/O thrombocytopenia  Acute anterior wall MI  H/O emphysema  S/P laparoscopic cholecystectomy      Postop course:     Patient reports moderate pain at chest wall incision sites which is worse with coughing and deep breathing without associated fever or dyspnea. Pain is improved with use of PCA and  oral pain meds.         Home Medications:  carvedilol 6.25 mg oral tablet: 1 orally 2 times a day (30 Aug 2023 07:23)  clopidogrel 75 mg oral tablet: 1 orally once a day last dose on 8/25/23 (30 Aug 2023 07:23)  Glucophage 1000 mg oral tablet: 1 orally 2 times a day (30 Aug 2023 07:23)  losartan 100 mg oral tablet: 1 orally once a day (30 Aug 2023 07:23)  Mucinex 600 mg oral tablet, extended release: 1 orally as needed for  cough (30 Aug 2023 07:23)  simvastatin 40 mg oral tablet: 1 orally once a day (30 Aug 2023 07:23)    PAST MEDICAL & SURGICAL HISTORY:  Diabetes      Lipoma      HTN (hypertension)      Pulmonary nodule, right      Non-Hodgkin lymphoma      CAD (coronary artery disease)      Stented coronary artery  2 stents, [id number]?      Hyperlipidemia      Lung cancer      Neuroendocrine cancer      Prostate cancer      History of renal insufficiency      Lymphoma      PAD (peripheral artery disease)      H/O thrombocytopenia      Acute anterior wall MI      H/O emphysema      s/p Angioplasty with Stent      S/P laparoscopic cholecystectomy      Status post lung surgery        Vital Signs Last 24 Hrs  T(C): 36.5 (30 Aug 2023 09:20), Max: 36.5 (30 Aug 2023 06:39)  T(F): 97.7 (30 Aug 2023 09:20), Max: 97.7 (30 Aug 2023 06:39)  HR: 84 (30 Aug 2023 10:20) (65 - 88)  BP: 167/119 (30 Aug 2023 10:20) (151/68 - 203/88)  BP(mean): 132 (30 Aug 2023 10:20) (90 - 132)  RR: 17 (30 Aug 2023 10:20) (4 - 25)  SpO2: 100% (30 Aug 2023 10:20) (96% - 100%)    Parameters below as of 30 Aug 2023 10:50  Patient On (Oxygen Delivery Method): ventilator    O2 Concentration (%): 40  I&O's Detail    CAPILLARY BLOOD GLUCOSE        Home Medications:  carvedilol 6.25 mg oral tablet: 1 orally 2 times a day (30 Aug 2023 07:23)  clopidogrel 75 mg oral tablet: 1 orally once a day last dose on 8/25/23 (30 Aug 2023 07:23)  Glucophage 1000 mg oral tablet: 1 orally 2 times a day (30 Aug 2023 07:23)  losartan 100 mg oral tablet: 1 orally once a day (30 Aug 2023 07:23)  Mucinex 600 mg oral tablet, extended release: 1 orally as needed for  cough (30 Aug 2023 07:23)  simvastatin 40 mg oral tablet: 1 orally once a day (30 Aug 2023 07:23)    MEDICATIONS  (STANDING):  atorvastatin 40 milliGRAM(s) Oral at bedtime  chlorhexidine 0.12% Liquid 15 milliLiter(s) Oral Mucosa every 12 hours  dexMEDEtomidine Infusion 0.5 MICROgram(s)/kG/Hr (9.19 mL/Hr) IV Continuous <Continuous>  dextrose 5%. 1000 milliLiter(s) (50 mL/Hr) IV Continuous <Continuous>  dextrose 5%. 1000 milliLiter(s) (100 mL/Hr) IV Continuous <Continuous>  dextrose 50% Injectable 25 Gram(s) IV Push once  dextrose 50% Injectable 12.5 Gram(s) IV Push once  dextrose 50% Injectable 25 Gram(s) IV Push once  glucagon  Injectable 1 milliGRAM(s) IntraMuscular once  insulin lispro (ADMELOG) corrective regimen sliding scale   SubCutaneous every 6 hours  lactated ringers. 1000 milliLiter(s) (75 mL/Hr) IV Continuous <Continuous>    MEDICATIONS  (PRN):  dextrose Oral Gel 15 Gram(s) Oral once PRN Blood Glucose LESS THAN 70 milliGRAM(s)/deciliter  labetalol Injectable 10 milliGRAM(s) IV Push every 4 hours PRN Systolic blood pressure >160    Mode: AC/ CMV (Assist Control/ Continuous Mandatory Ventilation)  RR (machine): 16  TV (machine): 400  FiO2: 40  PEEP: 5  ITime: 0.74  MAP: 8  PIP: 18      Physical exam:                             General:               Pt is intubated and sedated                                              Neuro:                 Could not assess                           Psych:                  Could not assess                          Cardiovascular:   S1 & S2, regular                           Respiratory:         Air entry is fair and equal on both sides, has bilateral conducted sounds                           GI:                          Soft, nondistended and nontender, Bowel sounds active                            Ext:                        No cyanosis or edema     Labs:                                                                           14.6   10.62 )-----------( 113      ( 30 Aug 2023 10:20 )             41.6       CXR:  Postop changes, ETT in place    Plan:  General: 83yMale s/p Flexible bronchoscopy and [facility] navigational bronchoscopy with biopsy of RUL nodule. Extensive bleeding noted from the superior segment s/p epinephrine and cold saline flushes with control of bleeding. EBL ~350-400cc. , kept intubated for bleeding.                             Neuro:                                         Pain control with Dilaudid IVP PRN    Continue Prop & Precedex for sedation                            Cardiovascular:                                          Telemetry (medical test) - Reviewed by me today independently. Pt is in normal sinus rhythm .                                          HLD: Resume  Zocar after extubation                                         HTN: Continue Labetalol IVP for now, restart Coreg after extubation                                        Continue hemodynamic monitoring to prevent decompensation.     CAD / MI: Hold Plavix                             Respiratory:                                         Postop hypoxemia requiring O2 via nasal cannula probably due to postop pain - Wean nasal cannula for goal O2sat above 92%.                                              CXR -bilateral congestion     Encourage incentive spirometry.                                                   Chest PT and frequent suctioning.                                          COPD: Continue bronchodilators, inhaled steroids, Pulmozyme and inhaled 3% saline inhalations.                                         CPAP wean and extubation if no signs of bleeding                                                                                           GI                                         NPO                                         Continue G.I prophylaxis with Protonix                                          Continue Zofran / Reglan for nausea - PRN                                                                                                          Renal:                                         Continue LR  30cc/hr                                         Monitor I/Os and electrolytes                                                                                        Hem/ Onc:                                         Hold SQ Heparin                                         Monitor chest tube output &  signs of bleeding.                                          Follow CBC in AM                           Infectious disease:                                            Monitor for fever / leukocytosis.                                          All surgical incision / chest tube  sites look clean                            Endocrine                                             DM-2 / Hyperglycemia: Continue Accu-Checks with coverage                                                Pertinent clinical, laboratory, radiographic, hemodynamic, echocardiographic, respiratory data, microbiologic data and chart were reviewed and analyzed frequently throughout the course of the day and night.     Patient seen, examined and plan discussed with CT Surgeon   / CTICU team during rounds.  OOB to chair and ambulate with physical therapy as tolerated.   Status discussed with patient's daughter and updated plan of care.   I have spent  70    minutes of critical care time with this pt between  7am and 11.59pm monitoring hemodynamic status, managing IV drips, respiratory status, and ventilator management.           Parker Gillespie MD

## 2023-08-30 NOTE — BRIEF OPERATIVE NOTE - NSICDXBRIEFPROCEDURE_GEN_ALL_CORE_FT
PROCEDURES:  Flexible bronchoscopy 30-Aug-2023 09:35:04  Inessa Garcia  Navigational bronchoscopy of right lung 30-Aug-2023 09:35:17  Inessa Garcia

## 2023-08-31 ENCOUNTER — TRANSCRIPTION ENCOUNTER (OUTPATIENT)
Age: 83
End: 2023-08-31

## 2023-08-31 VITALS
SYSTOLIC BLOOD PRESSURE: 157 MMHG | RESPIRATION RATE: 14 BRPM | OXYGEN SATURATION: 96 % | HEART RATE: 73 BPM | DIASTOLIC BLOOD PRESSURE: 60 MMHG

## 2023-08-31 LAB
ANION GAP SERPL CALC-SCNC: 11 MMOL/L — SIGNIFICANT CHANGE UP (ref 7–14)
BASOPHILS # BLD AUTO: 0.03 K/UL — SIGNIFICANT CHANGE UP (ref 0–0.2)
BASOPHILS NFR BLD AUTO: 0.4 % — SIGNIFICANT CHANGE UP (ref 0–2)
BUN SERPL-MCNC: 17 MG/DL — SIGNIFICANT CHANGE UP (ref 7–23)
CALCIUM SERPL-MCNC: 8.9 MG/DL — SIGNIFICANT CHANGE UP (ref 8.4–10.5)
CHLORIDE SERPL-SCNC: 104 MMOL/L — SIGNIFICANT CHANGE UP (ref 98–107)
CO2 SERPL-SCNC: 24 MMOL/L — SIGNIFICANT CHANGE UP (ref 22–31)
CREAT SERPL-MCNC: 1.49 MG/DL — HIGH (ref 0.5–1.3)
EGFR: 46 ML/MIN/1.73M2 — LOW
EOSINOPHIL # BLD AUTO: 0.17 K/UL — SIGNIFICANT CHANGE UP (ref 0–0.5)
EOSINOPHIL NFR BLD AUTO: 2.3 % — SIGNIFICANT CHANGE UP (ref 0–6)
GLUCOSE BLDC GLUCOMTR-MCNC: 153 MG/DL — HIGH (ref 70–99)
GLUCOSE SERPL-MCNC: 156 MG/DL — HIGH (ref 70–99)
HCT VFR BLD CALC: 39.5 % — SIGNIFICANT CHANGE UP (ref 39–50)
HGB BLD-MCNC: 13.6 G/DL — SIGNIFICANT CHANGE UP (ref 13–17)
IANC: 4.99 K/UL — SIGNIFICANT CHANGE UP (ref 1.8–7.4)
IMM GRANULOCYTES NFR BLD AUTO: 0.4 % — SIGNIFICANT CHANGE UP (ref 0–0.9)
LYMPHOCYTES # BLD AUTO: 1.17 K/UL — SIGNIFICANT CHANGE UP (ref 1–3.3)
LYMPHOCYTES # BLD AUTO: 15.9 % — SIGNIFICANT CHANGE UP (ref 13–44)
MCHC RBC-ENTMCNC: 31.3 PG — SIGNIFICANT CHANGE UP (ref 27–34)
MCHC RBC-ENTMCNC: 34.4 GM/DL — SIGNIFICANT CHANGE UP (ref 32–36)
MCV RBC AUTO: 91 FL — SIGNIFICANT CHANGE UP (ref 80–100)
MONOCYTES # BLD AUTO: 0.99 K/UL — HIGH (ref 0–0.9)
MONOCYTES NFR BLD AUTO: 13.4 % — SIGNIFICANT CHANGE UP (ref 2–14)
NEUTROPHILS # BLD AUTO: 4.99 K/UL — SIGNIFICANT CHANGE UP (ref 1.8–7.4)
NEUTROPHILS NFR BLD AUTO: 67.6 % — SIGNIFICANT CHANGE UP (ref 43–77)
NRBC # BLD: 0 /100 WBCS — SIGNIFICANT CHANGE UP (ref 0–0)
NRBC # FLD: 0 K/UL — SIGNIFICANT CHANGE UP (ref 0–0)
PLATELET # BLD AUTO: 107 K/UL — LOW (ref 150–400)
POTASSIUM SERPL-MCNC: 4.1 MMOL/L — SIGNIFICANT CHANGE UP (ref 3.5–5.3)
POTASSIUM SERPL-SCNC: 4.1 MMOL/L — SIGNIFICANT CHANGE UP (ref 3.5–5.3)
RBC # BLD: 4.34 M/UL — SIGNIFICANT CHANGE UP (ref 4.2–5.8)
RBC # FLD: 12.8 % — SIGNIFICANT CHANGE UP (ref 10.3–14.5)
SODIUM SERPL-SCNC: 139 MMOL/L — SIGNIFICANT CHANGE UP (ref 135–145)
WBC # BLD: 7.38 K/UL — SIGNIFICANT CHANGE UP (ref 3.8–10.5)
WBC # FLD AUTO: 7.38 K/UL — SIGNIFICANT CHANGE UP (ref 3.8–10.5)

## 2023-08-31 PROCEDURE — 99233 SBSQ HOSP IP/OBS HIGH 50: CPT

## 2023-08-31 PROCEDURE — 71045 X-RAY EXAM CHEST 1 VIEW: CPT | Mod: 26

## 2023-08-31 RX ORDER — ACETAMINOPHEN 500 MG
1000 TABLET ORAL ONCE
Refills: 0 | Status: COMPLETED | OUTPATIENT
Start: 2023-08-31 | End: 2023-08-31

## 2023-08-31 RX ORDER — INSULIN LISPRO 100/ML
VIAL (ML) SUBCUTANEOUS
Refills: 0 | Status: DISCONTINUED | OUTPATIENT
Start: 2023-08-31 | End: 2023-08-31

## 2023-08-31 RX ORDER — SODIUM CHLORIDE 9 MG/ML
500 INJECTION, SOLUTION INTRAVENOUS ONCE
Refills: 0 | Status: DISCONTINUED | OUTPATIENT
Start: 2023-08-31 | End: 2023-08-31

## 2023-08-31 RX ORDER — LOSARTAN POTASSIUM 100 MG/1
50 TABLET, FILM COATED ORAL DAILY
Refills: 0 | Status: DISCONTINUED | OUTPATIENT
Start: 2023-08-31 | End: 2023-08-31

## 2023-08-31 RX ORDER — ACETAMINOPHEN 500 MG
650 TABLET ORAL EVERY 6 HOURS
Refills: 0 | Status: DISCONTINUED | OUTPATIENT
Start: 2023-08-31 | End: 2023-08-31

## 2023-08-31 RX ORDER — CARVEDILOL PHOSPHATE 80 MG/1
6.25 CAPSULE, EXTENDED RELEASE ORAL EVERY 12 HOURS
Refills: 0 | Status: DISCONTINUED | OUTPATIENT
Start: 2023-08-31 | End: 2023-08-31

## 2023-08-31 RX ADMIN — Medication 1000 MILLIGRAM(S): at 10:30

## 2023-08-31 RX ADMIN — Medication 400 MILLIGRAM(S): at 10:00

## 2023-08-31 RX ADMIN — LOSARTAN POTASSIUM 50 MILLIGRAM(S): 100 TABLET, FILM COATED ORAL at 11:24

## 2023-08-31 RX ADMIN — CARVEDILOL PHOSPHATE 6.25 MILLIGRAM(S): 80 CAPSULE, EXTENDED RELEASE ORAL at 07:58

## 2023-08-31 NOTE — DISCHARGE NOTE PROVIDER - NSDCCPTREATMENT_GEN_ALL_CORE_FT
PRINCIPAL PROCEDURE  Procedure: Navigational bronchoscopy of right lung  Findings and Treatment:       SECONDARY PROCEDURE  Procedure: Flexible bronchoscopy  Findings and Treatment:

## 2023-08-31 NOTE — DISCHARGE NOTE PROVIDER - HOSPITAL COURSE
Mr. KATIUSKA GIRON, 83 year old male, poor historian, former smoker, w/ hx of Type 2 DM, PAD, HTN, HLD, non-hodgkins lymphoma diagnosed via bone biopsy(s/p Rituxan 2007 to 2009), High grade neuroendocrine lung cancer (2013) s/p adjuvant chemo September, 2013; Prostate cancer (11/2014) Completed External Bean RT December 2015, CAD - inferior wall MI s/p PCI, very mild LV Dysfunction; home oxygen as needed, Throombocytopenia (baseline platelet count 90k/uL) found to have a right upper lobe nodule. On 8/30 he underwent an elective navigational bronchoscopy with Dr. Malloy. During the procedure there was bleeding noted from the superior segment of the RLL and was lavaged with cold saline and epinephrine. His blood levels remained stable, chest x-rays remained unchanged and he was discharged home on 8/31.

## 2023-08-31 NOTE — DISCHARGE NOTE NURSING/CASE MANAGEMENT/SOCIAL WORK - NSDCPEFALRISK_GEN_ALL_CORE
For information on Fall & Injury Prevention, visit: [email or web address] OR  [email or web address] OR  https://www.cdc.gov/steadi/patient.html

## 2023-08-31 NOTE — DISCHARGE NOTE PROVIDER - NSDCMRMEDTOKEN_GEN_ALL_CORE_FT
carvedilol 6.25 mg oral tablet: 1 orally 2 times a day  clopidogrel 75 mg oral tablet: 1 orally once a day last dose on 8/25/23  Glucophage 1000 mg oral tablet: 1 orally 2 times a day  losartan 100 mg oral tablet: 1 orally once a day  Mucinex 600 mg oral tablet, extended release: 1 orally as needed for  cough  simvastatin 40 mg oral tablet: 1 orally once a day

## 2023-08-31 NOTE — PROGRESS NOTE ADULT - SUBJECTIVE AND OBJECTIVE BOX
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      HISTORY:   Patient denies cough and hemoptysis. Denies chest pain, dyspnea, pleuritic pain, fever.     PHYSICAL EXAM:   Gen: Comfortable, No acute distress  Eyes: Sclera white, Conjunctiva normal, Eyelids normal, Pupils symmetrical   ENT: Mucous membranes moist,  ,  ,    Neck: Trachea midline,  ,  ,  ,  ,  ,    CV: Rate regular, Rhythm regular,  ,  ,    Resp: Breath sounds clear, No accessory muscles use,  ,  ,    Abd: Soft, Non-distended, Non-tender,   ,  ,  ,    Skin: Warm, No peripheral edema of lower extremities,  ,    : No zamora  Neuro: Moving all 4 extremities,    Psych: A&Ox3      ASSESSMENT AND PLAN:     NEURO:  No pain issues.       RESPIRATORY:  Stable on room air - Incentive spirometry. Chest PT and suctioning of secretions. Out of bed to chair and ambulate with assistance. Continuous pulse oximetry for support & to prevent decompensation.       Hemoptysis - stable. will quantify total hemoptysis volume with specimen cups. airway monitoring.    CARDIOVASCULAR:  Hemodynamically stable - Not on pressors. Continue hemodynamic monitoring.  Telemetry (medical test) - Reviewed by me today independently. Normal sinus rhythm.  HTN - stable. Continue carvedilol. Start losartan.     Coronary artery disease and Peripheral artery disease - stable. Resume plavix tomorrow per thoracic surgeon       Mild chronic systolic heart failure - stable.  - Continue carvedilol PO  - Maintain euvolemia    RENAL:  CKD – Stable. Renally dose medications. Monitor for hyperkalemia and uremia. Avoid nephrotoxic medications. Monitor IOs and electrolytes.           GASTROINTESTINAL:  GI prophylaxis not indicated  Zofran and Reglan IV PRN for nausea  Regular consistency diet       HEMATOLOGIC:  No signs of active bleeding. Monitor Hgb in CBC in AM  DVT prophylaxis with SCDs.      Thrombocytopenia - stable.       INFECTIOUS DISEASE:  No signs of active infection. Will monitor for fever and leukocytosis.       ENDOCRINE:  DM2 – Stable. Monitor glucose fingersticks for goal 120-180. Insulin sliding scale. Carb control diet.               ONCOLOGY:  Lung nodule - S/P biopsy. Follow up final cytology.           Pertinent clinical, laboratory, radiographic, hemodynamic, echocardiographic, respiratory data, microbiologic data and chart were reviewed by myself and analyzed frequently throughout the course of the day and night by myself.    Plan discussed at length with the CTICU staff and Attending CT Surgeon -   Dr Herb Frances.      Patient's status was discussed with patient at bedside.       	  I have spent 50 minutes of time with this patient monitoring hemodynamic status, respiratory status, and coordinating care in the ICU.    ________________________________________________      _________________________  VITAL SIGNS:  Vital Signs Last 24 Hrs  T(C): 36.8 (31 Aug 2023 08:00), Max: 37.2 (30 Aug 2023 16:00)  T(F): 98.2 (31 Aug 2023 08:00), Max: 98.9 (30 Aug 2023 16:00)  HR: 80 (31 Aug 2023 10:00) (67 - 85)  BP: 145/81 (31 Aug 2023 10:00) (90/57 - 170/68)  BP(mean): 92 (31 Aug 2023 10:00) (65 - 99)  RR: 16 (31 Aug 2023 10:00) (11 - 21)  SpO2: 94% (31 Aug 2023 10:00) (94% - 100%)    Parameters below as of 31 Aug 2023 10:00  Patient On (Oxygen Delivery Method): room air      I/Os:   I&O's Detail    30 Aug 2023 07:01  -  31 Aug 2023 07:00  --------------------------------------------------------  IN:    Dexmedetomidine: 159.5 mL    Lactated Ringers: 1050 mL    Propofol: 107.8 mL    sodium chloride 0.9%: 210 mL  Total IN: 1527.3 mL    OUT:    Indwelling Catheter - Urethral (mL): 1165 mL    Voided (mL): 200 mL  Total OUT: 1365 mL    Total NET: 162.3 mL      31 Aug 2023 07:01  -  31 Aug 2023 11:02  --------------------------------------------------------  IN:    Lactated Ringers: 75 mL  Total IN: 75 mL    OUT:    Voided (mL): 400 mL  Total OUT: 400 mL    Total NET: -325 mL          Mode: CPAP with PS  FiO2: 40  PEEP: 5  PS: 10  MAP: 12  PIP: 15      MEDICATIONS:  MEDICATIONS  (STANDING):  acetaminophen   IVPB .. 1000 milliGRAM(s) IV Intermittent once  atorvastatin 40 milliGRAM(s) Oral at bedtime  carvedilol 6.25 milliGRAM(s) Oral every 12 hours  dextrose 50% Injectable 25 Gram(s) IV Push once  insulin lispro (ADMELOG) corrective regimen sliding scale   SubCutaneous three times a day before meals  losartan 50 milliGRAM(s) Oral daily    MEDICATIONS  (PRN):  acetaminophen     Tablet .. 650 milliGRAM(s) Oral every 6 hours PRN Temp greater or equal to 38C (100.4F), Mild Pain (1 - 3)      LABS:  Laboratory data was independently reviewed by me today.                           13.6   7.38  )-----------( 107      ( 31 Aug 2023 02:56 )             39.5     08-31    139  |  104  |  17  ----------------------------<  156<H>  4.1   |  24  |  1.49<H>    Ca    8.9      31 Aug 2023 02:56          ABG - ( 30 Aug 2023 18:13 )  pH, Arterial: 7.37  pH, Blood: x     /  pCO2: 37    /  pO2: 96    / HCO3: 21    / Base Excess: -3.4  /  SaO2: 98.0              Urinalysis Basic - ( 31 Aug 2023 02:56 )    Color: x / Appearance: x / SG: x / pH: x  Gluc: 156 mg/dL / Ketone: x  / Bili: x / Urobili: x   Blood: x / Protein: x / Nitrite: x   Leuk Esterase: x / RBC: x / WBC x   Sq Epi: x / Non Sq Epi: x / Bacteria: x        RADIOLOGY:   Radiology images were independently reviewed by me today. Reports were reviewed by me today.    Xray Chest 1 View- PORTABLE-Urgent:   ACC: [id number] EXAM:  XR CHEST PORTABLE URGENT 1V   ORDERED BY: CLAYTON OCHOA     PROCEDURE DATE:  08/30/2023          INTERPRETATION:  CLINICAL INDICATION: Postoperative evaluation. Status   post bronchoscopy and right upper lobe nodule biopsy    EXAM: Frontal radiograph of the chest.    COMPARISON: Chest radiograph from 5/8/2018. PET/CT 8/6/2023.    FINDINGS:  Endotracheal tube tip terminates above the williams.  Bilateral lower lung atelectasis without focal consolidation. Known right   upper lobe nodule better appreciated at PET CT 8/6/2023.  There is no pleural effusion or pneumothorax.  The heart size is normal.  Right axillary clips.  No acute bony abnormality.    IMPRESSION: Status post intubation and biopsy without complicating   pneumomediastinum or pneumothorax.      --- End of Report ---          KRISS PIZARRO MD; Resident Radiology  This document has been electronically signed.  ELMA MURO MD; Attending Radiologist  This document has been electronically signed. 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      HISTORY:   Patient denies cough and hemoptysis. Denies chest pain, dyspnea, pleuritic pain, fever.     PHYSICAL EXAM:   Gen: Comfortable, No acute distress  Eyes: Sclera white, Conjunctiva normal, Eyelids normal, Pupils symmetrical   ENT: Mucous membranes moist,  ,  ,    Neck: Trachea midline,  ,  ,  ,  ,  ,    CV: Rate regular, Rhythm regular,  ,  ,    Resp: Breath sounds clear, No accessory muscles use,  ,  ,    Abd: Soft, Non-distended, Non-tender,   ,  ,  ,    Skin: Warm, No peripheral edema of lower extremities,  ,    : No zamora  Neuro: Moving all 4 extremities,    Psych: A&Ox3      ASSESSMENT AND PLAN:     NEURO:  No pain issues.       RESPIRATORY:  Stable on room air - Incentive spirometry. Chest PT and suctioning of secretions. Out of bed to chair and ambulate with assistance. Continuous pulse oximetry for support & to prevent decompensation.       Hemoptysis - stable. will quantify total hemoptysis volume with specimen cups. airway monitoring.    COPD - stable.     CARDIOVASCULAR:  Hemodynamically stable - Not on pressors. Continue hemodynamic monitoring.  Telemetry (medical test) - Reviewed by me today independently. Normal sinus rhythm.  HTN - stable. Continue carvedilol. Start losartan.     Coronary artery disease and Peripheral artery disease - stable. Resume plavix tomorrow per thoracic surgeon       Mild chronic systolic heart failure - stable.  - Continue carvedilol PO  - Maintain euvolemia    RENAL:  CKD – Stable. Renally dose medications. Monitor for hyperkalemia and uremia. Avoid nephrotoxic medications. Monitor IOs and electrolytes.           GASTROINTESTINAL:  GI prophylaxis not indicated  Zofran and Reglan IV PRN for nausea  Regular consistency diet       HEMATOLOGIC:  No signs of active bleeding. Monitor Hgb in CBC in AM  DVT prophylaxis with SCDs.      Thrombocytopenia - stable.       INFECTIOUS DISEASE:  No signs of active infection. Will monitor for fever and leukocytosis.       ENDOCRINE:  DM2 – Stable. Monitor glucose fingersticks for goal 120-180. Insulin sliding scale. Carb control diet.               ONCOLOGY:  Lung nodule - S/P biopsy. Follow up final cytology.           Pertinent clinical, laboratory, radiographic, hemodynamic, echocardiographic, respiratory data, microbiologic data and chart were reviewed by myself and analyzed frequently throughout the course of the day and night by myself.    Plan discussed at length with the CTICU staff and Attending CT Surgeon -   Dr Herb Frances.      Patient's status was discussed with patient at bedside.       	  I have spent 50 minutes of time with this patient monitoring hemodynamic status, respiratory status, and coordinating care in the ICU.    ________________________________________________      _________________________  VITAL SIGNS:  Vital Signs Last 24 Hrs  T(C): 36.8 (31 Aug 2023 08:00), Max: 37.2 (30 Aug 2023 16:00)  T(F): 98.2 (31 Aug 2023 08:00), Max: 98.9 (30 Aug 2023 16:00)  HR: 80 (31 Aug 2023 10:00) (67 - 85)  BP: 145/81 (31 Aug 2023 10:00) (90/57 - 170/68)  BP(mean): 92 (31 Aug 2023 10:00) (65 - 99)  RR: 16 (31 Aug 2023 10:00) (11 - 21)  SpO2: 94% (31 Aug 2023 10:00) (94% - 100%)    Parameters below as of 31 Aug 2023 10:00  Patient On (Oxygen Delivery Method): room air      I/Os:   I&O's Detail    30 Aug 2023 07:01  -  31 Aug 2023 07:00  --------------------------------------------------------  IN:    Dexmedetomidine: 159.5 mL    Lactated Ringers: 1050 mL    Propofol: 107.8 mL    sodium chloride 0.9%: 210 mL  Total IN: 1527.3 mL    OUT:    Indwelling Catheter - Urethral (mL): 1165 mL    Voided (mL): 200 mL  Total OUT: 1365 mL    Total NET: 162.3 mL      31 Aug 2023 07:01  -  31 Aug 2023 11:02  --------------------------------------------------------  IN:    Lactated Ringers: 75 mL  Total IN: 75 mL    OUT:    Voided (mL): 400 mL  Total OUT: 400 mL    Total NET: -325 mL          Mode: CPAP with PS  FiO2: 40  PEEP: 5  PS: 10  MAP: 12  PIP: 15      MEDICATIONS:  MEDICATIONS  (STANDING):  acetaminophen   IVPB .. 1000 milliGRAM(s) IV Intermittent once  atorvastatin 40 milliGRAM(s) Oral at bedtime  carvedilol 6.25 milliGRAM(s) Oral every 12 hours  dextrose 50% Injectable 25 Gram(s) IV Push once  insulin lispro (ADMELOG) corrective regimen sliding scale   SubCutaneous three times a day before meals  losartan 50 milliGRAM(s) Oral daily    MEDICATIONS  (PRN):  acetaminophen     Tablet .. 650 milliGRAM(s) Oral every 6 hours PRN Temp greater or equal to 38C (100.4F), Mild Pain (1 - 3)      LABS:  Laboratory data was independently reviewed by me today.                           13.6   7.38  )-----------( 107      ( 31 Aug 2023 02:56 )             39.5     08-31    139  |  104  |  17  ----------------------------<  156<H>  4.1   |  24  |  1.49<H>    Ca    8.9      31 Aug 2023 02:56          ABG - ( 30 Aug 2023 18:13 )  pH, Arterial: 7.37  pH, Blood: x     /  pCO2: 37    /  pO2: 96    / HCO3: 21    / Base Excess: -3.4  /  SaO2: 98.0              Urinalysis Basic - ( 31 Aug 2023 02:56 )    Color: x / Appearance: x / SG: x / pH: x  Gluc: 156 mg/dL / Ketone: x  / Bili: x / Urobili: x   Blood: x / Protein: x / Nitrite: x   Leuk Esterase: x / RBC: x / WBC x   Sq Epi: x / Non Sq Epi: x / Bacteria: x        RADIOLOGY:   Radiology images were independently reviewed by me today. Reports were reviewed by me today.    Xray Chest 1 View- PORTABLE-Urgent:   ACC: [id number] EXAM:  XR CHEST PORTABLE URGENT 1V   ORDERED BY: CLAYTON OCHOA     PROCEDURE DATE:  08/30/2023          INTERPRETATION:  CLINICAL INDICATION: Postoperative evaluation. Status   post bronchoscopy and right upper lobe nodule biopsy    EXAM: Frontal radiograph of the chest.    COMPARISON: Chest radiograph from 5/8/2018. PET/CT 8/6/2023.    FINDINGS:  Endotracheal tube tip terminates above the williams.  Bilateral lower lung atelectasis without focal consolidation. Known right   upper lobe nodule better appreciated at PET CT 8/6/2023.  There is no pleural effusion or pneumothorax.  The heart size is normal.  Right axillary clips.  No acute bony abnormality.    IMPRESSION: Status post intubation and biopsy without complicating   pneumomediastinum or pneumothorax.      --- End of Report ---          KRISS PIZARRO MD; Resident Radiology  This document has been electronically signed.  ELMA MURO MD; Attending Radiologist  This document has been electronically signed. Aug 30 2023 11:37AM (08-30-23 @ 11:20)

## 2023-08-31 NOTE — DISCHARGE NOTE PROVIDER - NSDCFUSCHEDAPPT_GEN_ALL_CORE_FT
Garland Thompson  [facility]  CARDIOLOGY [address] [facility]   Scheduled Appointment: 10/19/2023    Shad Zhang  [facility]  UROLOGY [address] [facility]  Scheduled Appointment: 10/25/2023

## 2023-08-31 NOTE — DISCHARGE NOTE PROVIDER - CARE PROVIDER_API CALL
Kenan Malloy  Thoracic Surgery  [address] [place] [address] [place] [address]  Phone: [phone]  Fax: [phone]  Follow Up Time: 2 weeks

## 2023-08-31 NOTE — DISCHARGE NOTE PROVIDER - NSDCCPCAREPLAN_GEN_ALL_CORE_FT
PRINCIPAL DISCHARGE DIAGNOSIS  Diagnosis: Lung nodule  Assessment and Plan of Treatment: Please follow up with Dr. Cazares in the office in 2 weeks. Please call and make an appointment. You may shower. You may eat a regular diet. Please remain active & ambulatory but refrain from any heavy lifting.      SECONDARY DISCHARGE DIAGNOSES  Diagnosis: HTN (hypertension)  Assessment and Plan of Treatment: Continue your current treatment regimen as directed by your primary care provider. Please also follow up with your primary care provider in one week.    Diagnosis: HLD (hyperlipidemia)  Assessment and Plan of Treatment: Continue your current treatment regimen as directed by your primary care provider. Please also follow up with your primary care provider in one week.    Diagnosis: DM (diabetes mellitus)  Assessment and Plan of Treatment: Continue your current treatment regimen as directed by your primary care provider. Please also follow up with your primary care provider in one week.    Diagnosis: PAD (peripheral artery disease)  Assessment and Plan of Treatment: Continue your current treatment regimen as directed by your primary care provider. Please also follow up with your primary care provider in one week.    Diagnosis: CAD (coronary artery disease)  Assessment and Plan of Treatment: Continue your current treatment regimen as directed by your primary care provider. Please also follow up with your primary care provider in one week. Restart your Plavix tomorrow 9/1.

## 2023-08-31 NOTE — DISCHARGE NOTE PROVIDER - NSDCFUADDINST_GEN_ALL_CORE_FT
- Follow up with Dr. Malloy in 2 weeks [phone]. Please call the office to make an appointment.   - Have your Chest x-ray done 1-2 days prior to your appointment.    - Please bring copy of x-ray to your appointment.  - Follow up with primary care provider in one week.  - Take the prescribed pain medication ONLY as needed.  - Can use over the counter Ibuprofen & or Tylenol as directed on the bottle.   - Please walk 4-5 x per day; increase as tolerated. You may climb stairs.   - Continue to use the incentive spirometer.   - Please call the office at [phone] if you have fevers, chills, worsening shortness of breath, chest pain.

## 2023-09-01 PROBLEM — C7A.8 OTHER MALIGNANT NEUROENDOCRINE TUMORS: Chronic | Status: ACTIVE | Noted: 2023-08-22

## 2023-09-01 PROBLEM — J43.9 EMPHYSEMA, UNSPECIFIED: Chronic | Status: ACTIVE | Noted: 2023-08-22

## 2023-09-01 PROBLEM — I73.9 PERIPHERAL VASCULAR DISEASE, UNSPECIFIED: Chronic | Status: ACTIVE | Noted: 2023-08-22

## 2023-09-01 PROBLEM — C85.90 NON-HODGKIN LYMPHOMA, UNSPECIFIED, UNSPECIFIED SITE: Chronic | Status: ACTIVE | Noted: 2023-08-22

## 2023-09-01 PROBLEM — Z87.448 PERSONAL HISTORY OF OTHER DISEASES OF URINARY SYSTEM: Chronic | Status: ACTIVE | Noted: 2023-08-22

## 2023-09-05 ENCOUNTER — APPOINTMENT (OUTPATIENT)
Dept: THORACIC SURGERY | Facility: CLINIC | Age: 83
End: 2023-09-05
Payer: MEDICARE

## 2023-09-05 DIAGNOSIS — R91.1 SOLITARY PULMONARY NODULE: ICD-10-CM

## 2023-09-05 DIAGNOSIS — R91.8 OTHER NONSPECIFIC ABNORMAL FINDING OF LUNG FIELD: ICD-10-CM

## 2023-09-05 LAB — NON-GYNECOLOGICAL CYTOLOGY STUDY: SIGNIFICANT CHANGE UP

## 2023-09-05 PROCEDURE — 99443: CPT | Mod: 95

## 2023-09-05 NOTE — ASSESSMENT
[FreeTextEntry1] : Mr. 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 Chip Carter.    - 5/2013 s/p right VATS, Right lower lobe wedge resection w/ MLND (5/2013) w/ Dr. Mcintyre Not felt to be a suitable candidate for lobectomy due to diminished lung functions. 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    Now, s/p Flex bronch, [place] amaris bronch, biopsy of the RUL nodule, BAL on 8/30/23. Path pending.   I have independently reviewed the medical records and imaging at the time of this office consultation, and discussed the following interpretations with the patient: - Discussed that during procedure, unable to obtain adequate tissue due to hemoptysis. Discussed referring to IR for CT guided biopsy to obtain a tissue diagnosis. Patient is agreeable. Office will help coordinate.   I have independently reviewed the medical records and imaging at the time of this office consultation, and discussed the following interpretations with the patient:  I, Dr. LORENZO [facility], personally performed the evaluation and management (E/M) services for this established patient. That E/M includes conducting the examination, assessing all new/exacerbated conditions, and establishing a new plan of care. Today, My ACP, Debra Bragg, was here to observe my evaluation and management services for this patient to be followed going forward.

## 2023-09-05 NOTE — REASON FOR VISIT
[Follow-Up: _____] : a [unfilled] follow-up visit [Home] : at home, [unfilled] , at the time of the visit. [Medical Office: ([facility])___] : at the medical office located in  [Verbal consent obtained from patient] : the patient, [unfilled]

## 2023-09-05 NOTE — HISTORY OF PRESENT ILLNESS
[FreeTextEntry1] : Mr. 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 Chip Carter.    - 5/2013 s/p right VATS, Right lower lobe wedge resection w/ MLND (5/2013) w/ Dr. Mcintyre Not felt to be a suitable candidate for lobectomy due to diminished lung functions. 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 - New solid, irregularly marginated 1.5 cm nodule in the right upper lobe (series 2, image 58); a subsegmental bronchus extends into the lesion.  - Other small nodules, including a 6 mm posterior RUL nodule (image 42) and 4 mm superior LLL nodule (image 43) remain unchanged. - Status post right axillary abdiel dissection, with unchanged right axillary lymph nodes measuring up to 1.4 cm short axis. No left axillary, mediastinal or hilar lymphadenopathy. Unremarkable thyroid. - s/p Cholecystectomy. Stable 1.6 cm left adrenal nodule. - No aggressive bone lesions. Right posterior rib deformities may be related to prior thoracotomy. 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  PET/CT on 8/6/2023:  - FDG avid right parotid nodule, 1.1 x 0.6 cm, SUV 4 (image 57) - 5-10 FDG avid right axillary lymph nodes, with index node 1.8 x 1.5 cm, SUV 7.6 (image 101) ; FDG avid subcarinal nodes, with index node in the left, 1.4 x 0.8 cm, SUV 6 (image 120) - Symmetrical low-level activity in the tracey. FDG avid paravertebral soft tissue, for example a focus measuring 2.7 x 1.7 cm superior to the aortic arch, SUV 6, image 102. More inferiorly there is FDG avid left paravertebral soft tissue, difficult to delineate on CT, SUV 5, image 139. - FDG avid RUL nodule, 1.5 x 0.8 cm, SUV 2.8 (image 118) similar to recent CT. - Postsurgical change in the RLL. - Similar 6 mm posterior RUL nodule (image 108)  - Similar 4 mm superior LLL (image 43) - Emphysema.  - FDG avid right inguinal lymph node with a fatty hilum, 1.3 x 1.2 cm, SUV 4 (image 264); previously 0.9 x 0.8 cm and not FDG avid. FDG avid soft tissue anterior to the inferior lumbar spine and sacrum, difficult to delineate on CT, SUV 6.5 (image 218)  - Coronary and large vessel calcifications. - Mildly increased uptake in a left anterior rib costochondral junction, without underlying CT abnormality, SUV 2.6 (image 140)  - Mild increased uptake in 2 left posterior ribs (image 128 and 131) Increased uptake in a left upper lateral rib (image 100) - Intramuscular lipoma adjacent to the right scapula, no FDG avidity (image 106) - Intramuscular lipoma in the right lateral chest wall, no FDG avidity (image 135) Right thigh intramuscular lipoma, no FDG avidity (image 240)  Of Note: experiencing pain in right shoulder blade and bilateral lower back and spine  Patient is US bx of right axillary LN on 08/11/2023. Positive for malignant cells; Follicular lymphoma, grade 1-2  Now, s/p Flex bronch, [place] amaris bronch, biopsy of the RUL nodule, BAL on 8/30/23. Path pending.   Patient presents today for follow up, via tele.

## 2023-09-14 PROBLEM — I21.09 ST ELEVATION (STEMI) MYOCARDIAL INFARCTION INVOLVING OTHER CORONARY ARTERY OF ANTERIOR WALL: Chronic | Status: ACTIVE | Noted: 2023-08-22

## 2023-09-14 PROBLEM — C61 MALIGNANT NEOPLASM OF PROSTATE: Chronic | Status: ACTIVE | Noted: 2023-01-01

## 2023-09-14 PROBLEM — Z86.2 PERSONAL HISTORY OF DISEASES OF THE BLOOD AND BLOOD-FORMING ORGANS AND CERTAIN DISORDERS INVOLVING THE IMMUNE MECHANISM: Chronic | Status: ACTIVE | Noted: 2023-08-22

## 2023-09-19 ENCOUNTER — APPOINTMENT (OUTPATIENT)
Dept: THORACIC SURGERY | Facility: CLINIC | Age: 83
End: 2023-09-19

## 2023-09-24 ENCOUNTER — NON-APPOINTMENT (OUTPATIENT)
Age: 83
End: 2023-09-24

## 2023-10-17 ENCOUNTER — APPOINTMENT (OUTPATIENT)
Dept: RADIATION ONCOLOGY | Facility: CLINIC | Age: 83
End: 2023-10-17

## 2023-10-18 PROBLEM — R06.02 SHORTNESS OF BREATH ON EXERTION: Status: ACTIVE | Noted: 2017-03-10

## 2023-10-19 ENCOUNTER — APPOINTMENT (OUTPATIENT)
Dept: CARDIOLOGY | Facility: CLINIC | Age: 83
End: 2023-10-19

## 2023-10-19 DIAGNOSIS — I10 ESSENTIAL (PRIMARY) HYPERTENSION: ICD-10-CM

## 2023-10-19 DIAGNOSIS — R06.02 SHORTNESS OF BREATH: ICD-10-CM

## 2023-10-25 ENCOUNTER — APPOINTMENT (OUTPATIENT)
Dept: UROLOGY | Facility: CLINIC | Age: 83
End: 2023-10-25

## 2023-11-03 ENCOUNTER — NON-APPOINTMENT (OUTPATIENT)
Age: 83
End: 2023-11-03

## 2023-11-03 ENCOUNTER — APPOINTMENT (OUTPATIENT)
Dept: RADIATION ONCOLOGY | Facility: CLINIC | Age: 83
End: 2023-11-03

## 2023-11-03 ENCOUNTER — APPOINTMENT (OUTPATIENT)
Dept: RADIATION ONCOLOGY | Facility: CLINIC | Age: 83
End: 2023-11-03
Payer: MEDICARE

## 2023-11-03 VITALS — HEIGHT: 69 IN | BODY MASS INDEX: 24.44 KG/M2 | RESPIRATION RATE: 18 BRPM | WEIGHT: 165 LBS

## 2023-11-03 PROCEDURE — 99204 OFFICE O/P NEW MOD 45 MIN: CPT

## 2023-11-13 ENCOUNTER — OUTPATIENT (OUTPATIENT)
Dept: OUTPATIENT SERVICES | Facility: HOSPITAL | Age: 83
LOS: 1 days | Discharge: HOME | End: 2023-11-13
Payer: MEDICARE

## 2023-11-13 PROCEDURE — 77263 THER RADIOLOGY TX PLNG CPLX: CPT

## 2023-11-13 PROCEDURE — 77334 RADIATION TREATMENT AID(S): CPT | Mod: 26

## 2023-11-13 PROCEDURE — 77290 THER RAD SIMULAJ FIELD CPLX: CPT | Mod: 26

## 2023-11-14 DIAGNOSIS — C34.11 MALIGNANT NEOPLASM OF UPPER LOBE, RIGHT BRONCHUS OR LUNG: ICD-10-CM

## 2023-12-15 ENCOUNTER — NON-APPOINTMENT (OUTPATIENT)
Age: 83
End: 2023-12-15

## 2023-12-15 NOTE — VITALS
[Maximal Pain Intensity: 0/10] : 0/10 [Least Pain Intensity: 0/10] : 0/10 [80: Normal activity with effort; some signs or symptoms of disease.] : 80: Normal activity with effort; some signs or symptoms of disease.  [ECOG Performance Status: 2 - Ambulatory and capable of all self care but unable to carry out any work activities] : Performance Status: 2 - Ambulatory and capable of all self care but unable to carry out any work activities. Up and about more than 50% of waking hours [5 - Distress Level] : Distress Level: 5

## 2023-12-17 NOTE — REVIEW OF SYSTEMS
[Negative] : Allergic/Immunologic [FreeTextEntry5] : HTN [FreeTextEntry6] : h/o surgery for lung mass [FreeTextEntry8] : h/o radiation to prostate cancer

## 2023-12-17 NOTE — DISEASE MANAGEMENT
[Pathological] : TNM Stage: p [N/A] : Currently not applicable [TTNM] : x [MTNM] : x [NTNM] : x [de-identified] : 48 Gy [de-identified] : right lung

## 2023-12-17 NOTE — HISTORY OF PRESENT ILLNESS
[FreeTextEntry1] : Mr. Wan is an 83 year old male former smoker with PMHx of T2DM, PAD, HTN, HLD, NHL diagnosed via bone biopsy (s/p Rituxan [id number], prostate cancer s/p EBRT 11/2014, high grade pT1aN0 neuroendocrine lung cancer s/p wedge resection + adjuvant chemo in 2013, now presenting with a new lung nodule appreciated on surveillance CT scan 7/2/23. For this, he is s/p bronchoscopy with biopsy with significant bleeding and only producing tissue insufficient for diagnosis. h/o radiation to right lung in 12/2023 7/2023 - CT scan: new solid, irregularly marginated 1.5 cm nodule in the right upper lobe (series 2, image 58); a subsegmental bronchus extends into the lesion. - Other small nodules, including a 6 mm posterior RUL nodule (image 42) and 4 mm superior LLL nodule (image 43) remain unchanged. - Status post right axillary abdiel dissection, with unchanged right axillary lymph nodes measuring up to 1.4 cm short axis. No left axillary, mediastinal or hilar lymphadenopathy. Unremarkable thyroid. - s/p Cholecystectomy. Stable 1.6 cm left adrenal nodule. - No aggressive bone lesions. Right posterior rib deformities may be related to prior thoracotomy. 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  PET/CT on 8/6/2023: - FDG avid right parotid nodule, 1.1 x 0.6 cm, SUV 4 (image 57) - 5-10 FDG avid right axillary lymph nodes, with index node 1.8 x 1.5 cm, SUV 7.6 (image 101) ; FDG avid subcarinal nodes, with index node in the left, 1.4 x 0.8 cm, SUV 6 (image 120) - Symmetrical low-level activity in the tracey. FDG avid paravertebral soft tissue, for example a focus measuring 2.7 x 1.7 cm superior to the aortic arch, SUV 6, image 102. More inferiorly there is FDG avid left paravertebral soft tissue, difficult to delineate on CT, SUV 5, image 139. - FDG avid RUL nodule, 1.5 x 0.8 cm, SUV 2.8 (image 118) similar to recent CT. - Postsurgical change in the RLL. - Similar 6 mm posterior RUL nodule (image 108) - Similar 4 mm superior LLL (image 43) - Emphysema. - FDG avid right inguinal lymph node with a fatty hilum, 1.3 x 1.2 cm, SUV 4 (image 264); previously 0.9 x 0.8 cm and not FDG avid. FDG avid soft tissue anterior to the inferior lumbar spine and sacrum, difficult to delineate on CT, SUV 6.5 (image 218) - Coronary and large vessel calcifications. 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 Wan presents for radiation therapy recommendations. He reports feeling well.   12/15/2023 OTV. 4/4 fractions of radiation to right lung completed. c/o SOB on exertion. No chest pain, headache. Advised to continue f/u with surgeon and Med Onc Dr Carter @ [facility].

## 2024-01-01 ENCOUNTER — OUTPATIENT (OUTPATIENT)
Dept: OUTPATIENT SERVICES | Facility: HOSPITAL | Age: 84
LOS: 1 days | Discharge: HOME | End: 2024-01-01
Payer: MEDICARE

## 2024-01-01 ENCOUNTER — OUTPATIENT (OUTPATIENT)
Dept: OUTPATIENT SERVICES | Facility: HOSPITAL | Age: 84
LOS: 1 days | End: 2024-01-01
Payer: MEDICARE

## 2024-01-01 DIAGNOSIS — R91.8 OTHER NONSPECIFIC ABNORMAL FINDING OF LUNG FIELD: ICD-10-CM

## 2024-01-01 PROCEDURE — 77300 RADIATION THERAPY DOSE PLAN: CPT | Mod: 26

## 2024-01-01 PROCEDURE — 77290 THER RAD SIMULAJ FIELD CPLX: CPT | Mod: 26

## 2024-01-01 PROCEDURE — 77334 RADIATION TREATMENT AID(S): CPT | Mod: 26

## 2024-01-01 PROCEDURE — 77263 THER RADIOLOGY TX PLNG CPLX: CPT

## 2024-01-01 PROCEDURE — 71260 CT THORAX DX C+: CPT

## 2024-01-01 PROCEDURE — 77295 3-D RADIOTHERAPY PLAN: CPT | Mod: 26

## 2024-01-10 ENCOUNTER — OUTPATIENT (OUTPATIENT)
Dept: OUTPATIENT SERVICES | Facility: HOSPITAL | Age: 84
LOS: 1 days | Discharge: HOME | End: 2024-01-10

## 2024-01-10 DIAGNOSIS — C85.88 OTHER SPECIFIED TYPES OF NON-HODGKIN LYMPHOMA, LYMPH NODES OF MULTIPLE SITES: ICD-10-CM

## 2024-01-19 ENCOUNTER — APPOINTMENT (OUTPATIENT)
Dept: RADIATION ONCOLOGY | Facility: CLINIC | Age: 84
End: 2024-01-19

## 2024-02-28 ENCOUNTER — RESULT REVIEW (OUTPATIENT)
Age: 84
End: 2024-02-28

## 2024-02-28 ENCOUNTER — APPOINTMENT (OUTPATIENT)
Dept: HEMATOLOGY ONCOLOGY | Facility: CLINIC | Age: 84
End: 2024-02-28
Payer: MEDICARE

## 2024-02-28 VITALS
SYSTOLIC BLOOD PRESSURE: 103 MMHG | BODY MASS INDEX: 23.59 KG/M2 | TEMPERATURE: 98.2 F | OXYGEN SATURATION: 95 % | RESPIRATION RATE: 18 BRPM | HEART RATE: 81 BPM | WEIGHT: 159.73 LBS | DIASTOLIC BLOOD PRESSURE: 63 MMHG

## 2024-02-28 LAB
BASOPHILS # BLD AUTO: 0.03 K/UL — SIGNIFICANT CHANGE UP (ref 0–0.2)
BASOPHILS NFR BLD AUTO: 0.6 % — SIGNIFICANT CHANGE UP (ref 0–2)
EOSINOPHIL # BLD AUTO: 0.08 K/UL — SIGNIFICANT CHANGE UP (ref 0–0.5)
EOSINOPHIL NFR BLD AUTO: 1.6 % — SIGNIFICANT CHANGE UP (ref 0–6)
HCT VFR BLD CALC: 43.7 % — SIGNIFICANT CHANGE UP (ref 39–50)
HGB BLD-MCNC: 15.1 G/DL — SIGNIFICANT CHANGE UP (ref 13–17)
IMM GRANULOCYTES NFR BLD AUTO: 0.8 % — SIGNIFICANT CHANGE UP (ref 0–0.9)
LYMPHOCYTES # BLD AUTO: 0.83 K/UL — LOW (ref 1–3.3)
LYMPHOCYTES # BLD AUTO: 16.8 % — SIGNIFICANT CHANGE UP (ref 13–44)
MCHC RBC-ENTMCNC: 32.8 PG — SIGNIFICANT CHANGE UP (ref 27–34)
MCHC RBC-ENTMCNC: 34.6 G/DL — SIGNIFICANT CHANGE UP (ref 32–36)
MCV RBC AUTO: 95 FL — SIGNIFICANT CHANGE UP (ref 80–100)
MONOCYTES # BLD AUTO: 1.07 K/UL — HIGH (ref 0–0.9)
MONOCYTES NFR BLD AUTO: 21.7 % — HIGH (ref 2–14)
NEUTROPHILS # BLD AUTO: 2.88 K/UL — SIGNIFICANT CHANGE UP (ref 1.8–7.4)
NEUTROPHILS NFR BLD AUTO: 58.5 % — SIGNIFICANT CHANGE UP (ref 43–77)
NRBC # BLD: 0 /100 WBCS — SIGNIFICANT CHANGE UP (ref 0–0)
PLATELET # BLD AUTO: 104 K/UL — LOW (ref 150–400)
RBC # BLD: 4.6 M/UL — SIGNIFICANT CHANGE UP (ref 4.2–5.8)
RBC # FLD: 14.6 % — HIGH (ref 10.3–14.5)
WBC # BLD: 4.93 K/UL — SIGNIFICANT CHANGE UP (ref 3.8–10.5)
WBC # FLD AUTO: 4.93 K/UL — SIGNIFICANT CHANGE UP (ref 3.8–10.5)

## 2024-02-28 PROCEDURE — G2211 COMPLEX E/M VISIT ADD ON: CPT

## 2024-02-28 PROCEDURE — 99214 OFFICE O/P EST MOD 30 MIN: CPT

## 2024-02-28 NOTE — PHYSICAL EXAM
[Restricted in physically strenuous activity but ambulatory and able to carry out work of a light or sedentary nature] : Status 1- Restricted in physically strenuous activity but ambulatory and able to carry out work of a light or sedentary nature, e.g., light house work, office work [Normal Male] : prostate smooth, symmetric with no modularity or induration [Normal] : affect appropriate [de-identified] : soft mass on right thigh overlying the lateral hip, non-tender, no eccyhmosis, 3x4 fbs, no change. [de-identified] : small 1-2 fb sucutaneous lesion right cheek

## 2024-02-28 NOTE — ASSESSMENT
[FreeTextEntry1] : LBP Patient states that has had chronic left LBP Increase pain when walks. He also notes pain in left sacral notch and radiates down right leg. He notes pain increase when goes to walk.  Lymphoma: Remains in remission for his low grade lymphoma. PET 8/2023 showed signs for recurrence and no need for treamtent.  Neuroendocrine CA: Patient remains in remission for his stage one neuroendocrine CA. Lung RUL mass new on 7/25/23 on CT ordered by Dr Malloy. Biopsy unsuccessful. Due to complication did not want to repeat bx. Pt completed 48 Gy / 4 Fx of RT to right lung in 12/2023 with Dr Locke.  CAD:  inferior wall MI s/p PCI, very mild LV Dysfunction   Prostate cancer - Nan 6 prostate cancer from 11/2014. s/p external bean radiation. PSA stable.  Will need a new urologist.

## 2024-02-28 NOTE — HISTORY OF PRESENT ILLNESS
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 The lung cancer was diagnosed in May 2013, was resected as a T1 lesion and received 4 cycles of cisplatin and etoposide between June and September 2013. He also has low grade lymphoma which was diagnosed as bone disease, retroperitoneal nodes and axillary nodes for which he received single agent Rituxan from 2007 to 2009.  He is here for followup of worsening mild thrombocytopenia. Platelet count has been in the 90k/uL range and there is no evidence of bleeding or bruising.   Also, there is a right hip mass is a lipoma per recent CAT scan. I reviewed with radiology -- it showed a 3x2.5 cm mass in 2010 (retrospectively) and now is 6x4 cm. The lesion is homogeneous on scan and is not painful.  I spoke with Dr. Ray ion 3/24/15 who notes Nan 6 prostate cancer from 11/2014.  He ordered bone scan and will refer to Dr. Cardoza. Patient completed EBRT for 6 weeks in DEC 2015. Has no further diarrhea or blood in stool. Now eating well, weight is stable.   7/20/16 CT of chest c/w: IMPRESSION:  Emphysema  with  right  lower  lobe  prior  wedge  resection  and resolution  of  the  two  nodules  that  were  new  on  the  prior  study.  Left  lower lobe  4  mm  nodule  stable  from  several  prior  studies.  Continued  follow-up recommended.  2/10/17- CT of chest 2/4/17 without recurrence of disease. Patient reports he continues with arthralgias. Also continues with intermittent dizziness with position changes. He has not seen his cardiologist yet. Patient reports diarrhea has resolved and has been followed up with GI.  4/6/2018: Patient complain of bilateral lower lumbar pain and radiation down back of right leg from sciatic notch to lateral and anterior thigh. This makes it difficult to walk. Pain has been present for ~ 1 year but now getting worse. While in bed or sitting in chair, there is no pain. Otherwise he has no other new complaints. He had an MRI at [facility] on 3/2/18 ordered by Dr. Eddie Graves.  Various signal changes, arthritic changes and disk issues are seen. Last MRI was done at [facility] 10/2016. CAT 2/2018 of T/A/P showed stable and no evidence of relapse. Patient states he notices food stuck in throat intermittently over the past year.   11/16/2018: Patient states that he has blindness in the right eye. After some "treatment" this has gotten better. Also he complain of right low back and hip pain. He had an injection to back and hip that did not help with Dr. Gutiérrez [phone].   2/8/2019: today patient complain of pain in low back and right hip to anterior thigh. Also notes pain when stands in the left upper anterior thigh when he rises from standing. He saw Dr. Humphrey who is a physiatrist. XRAYs of right hip ordered. This was compared to CAT 11/2018 and showed some chronic changes. Patient advised to see a Neurosurgeon  5/17/2019: Mr Wan is here for follow up.  Today he is c/o dizziness which started once her got out his car.  After sitting down her feels better.  Mr Wan continues to complain about lower back pain.  He was seen by Dr Interiano (Ortho) who referred the patient to Dr Harmon.  He was seen by Dr Harmon (Spine) on 2/2019.  Patient had trigger point injections 4 times and PT with no help.  He recommended physical therapy and home exercise program with anti-inflammatory medication as needed.  11/19/2019:  Low back pain - has been undergoing injections again with Dr. Allison in [place]. He states no benefit yet. Prostate cancer - [place] 6 prostate cancer from 11/2014. s/p external bean radiation. PSA stable. Follows with Dr. Ray.  10/2/2020: Patient is here for an annual follow up. 1) Lower back pain:  Patient continues to complain of lower back.  It is worse during ambulation and better when sitting down.  He is using an "opioid" however does not remember which medication.  2) Neuroendocrine: CAT chest, abdomen/pelvis on 12/2019 demonstrate no evidence of recurrence. 3) Lymphoma: Denies fever, chills, adenopathy, change in weight or appetite. 4) Constipation: He is scheduled for colonoscopy/endoscopy with  Dr. Stefan Estevez (GI) in mid October 2020. 5) Social: He lives at home alone.  He is able to perform his routine/instrumental ADLs without restriction.  2/25/2022: 1) Lymphoma: Remains in remission for his low grade lymphoma. Last CT chest, abdomen/pelvis on 3/2021 revealed unchanged lymph nodes. 2) Neuroendocrine CA: Patient remains in remission for his stage one neuroendocrine CA. 3) Possible thrombus: Patient was evaluated by cardiology, Dr. Merchant.  US Duplex ordered.  Patient c/o chest discomfort and palpitation during exertion.  TTE and nuclear stress ordered. 4) COVID-19 vaccine: Patient received all three moderna vaccines. 5) Social: Patient lives home alone however has home attendant.  4/15/2022: Patient presents to the office today c/o of abdominal pain. The abdominal pain is located on LUQ that radiates towards the left back. As per patient, pain started for a couple of months. Patient rates the pain is 4/10 in severity. The pain is worse when touched.  Denies it currently. It is not associated with movements or meals. Denies nausea, vomiting, diarrhea, dysuria, flank pain or hematuria. His bowel movements are erratic and does not have a bowel movement every day. He is not using any stool softeners.  2/28/2024 LBP Patient states that has had chronic left LBP Increase pain when walks. He also notes pain in left sacral notch and radiates down right leg. He notes pain increase when goes to walk.  Lymphoma: Remains in remission for his low grade lymphoma. Last CT chest, abdomen/pelvis on 3/2021 revealed unchanged lymph nodes.  Neuroendocrine CA: Patient remains in remission for his stage one neuroendocrine CA. Lung RUL mass new on 7/25/23 on CT ordered by Dr Malloy. Pt completed 48 Gy / 4 Fx of RT to right lung in 12/2023 with Dr Locke.  CAD:  inferior wall MI s/p PCI, very mild LV Dysfunction. Should follow with Dr Leandro Thompson.  Prostate cancer - Nan 6 prostate cancer from 11/2014. s/p external bean radiation. PSA stable. Followed with Dr. Ray in past. [de-identified] : neuroendocrine, high grade [de-identified] : Petr Humphrey: [phone] Stefan Estevez, ()  [facility] [phone]

## 2024-03-01 LAB
ALBUMIN SERPL ELPH-MCNC: 4.4 G/DL
ALP BLD-CCNC: 58 U/L
ALT SERPL-CCNC: 12 U/L
ANION GAP SERPL CALC-SCNC: 12 MMOL/L
AST SERPL-CCNC: 18 U/L
BILIRUB SERPL-MCNC: 0.6 MG/DL
BUN SERPL-MCNC: 22 MG/DL
CALCIUM SERPL-MCNC: 9.4 MG/DL
CEA SERPL-MCNC: 1.2 NG/ML
CHLORIDE SERPL-SCNC: 97 MMOL/L
CO2 SERPL-SCNC: 24 MMOL/L
CREAT SERPL-MCNC: 1.7 MG/DL
EGFR: 39 ML/MIN/1.73M2
ESTIMATED AVERAGE GLUCOSE: 157 MG/DL
GLUCOSE SERPL-MCNC: 113 MG/DL
HBA1C MFR BLD HPLC: 7.1 %
LDH SERPL-CCNC: 155 U/L
POTASSIUM SERPL-SCNC: 5.1 MMOL/L
PROT SERPL-MCNC: 6.4 G/DL
PSA SERPL-MCNC: 0.54 NG/ML
SODIUM SERPL-SCNC: 134 MMOL/L

## 2024-03-25 ENCOUNTER — RESULT REVIEW (OUTPATIENT)
Age: 84
End: 2024-03-25

## 2024-03-30 ENCOUNTER — OUTPATIENT (OUTPATIENT)
Dept: OUTPATIENT SERVICES | Facility: HOSPITAL | Age: 84
LOS: 1 days | End: 2024-03-30
Payer: MEDICARE

## 2024-03-30 ENCOUNTER — APPOINTMENT (OUTPATIENT)
Dept: CT IMAGING | Facility: IMAGING CENTER | Age: 84
End: 2024-03-30
Payer: MEDICARE

## 2024-03-30 DIAGNOSIS — C85.10 UNSPECIFIED B-CELL LYMPHOMA, UNSPECIFIED SITE: ICD-10-CM

## 2024-03-30 PROCEDURE — 74177 CT ABD & PELVIS W/CONTRAST: CPT | Mod: 26,MH

## 2024-03-30 PROCEDURE — 74177 CT ABD & PELVIS W/CONTRAST: CPT

## 2024-03-30 PROCEDURE — 71260 CT THORAX DX C+: CPT | Mod: 26,MH

## 2024-03-30 PROCEDURE — 71260 CT THORAX DX C+: CPT

## 2024-04-08 DIAGNOSIS — C61 MALIGNANT NEOPLASM OF PROSTATE: ICD-10-CM

## 2024-04-16 PROBLEM — M89.9 BONE LESION: Status: ACTIVE | Noted: 2024-04-16

## 2024-04-16 PROBLEM — C85.10 B-CELL LYMPHOMA: Status: ACTIVE | Noted: 2019-12-16

## 2024-04-17 ENCOUNTER — APPOINTMENT (OUTPATIENT)
Dept: INTERVENTIONAL RADIOLOGY/VASCULAR | Facility: CLINIC | Age: 84
End: 2024-04-17
Payer: MEDICARE

## 2024-04-17 DIAGNOSIS — Z79.01 LONG TERM (CURRENT) USE OF ANTICOAGULANTS: ICD-10-CM

## 2024-04-17 DIAGNOSIS — C85.10 UNSPECIFIED B-CELL LYMPHOMA, UNSPECIFIED SITE: ICD-10-CM

## 2024-04-17 DIAGNOSIS — C85.90 NON-HODGKIN LYMPHOMA, UNSPECIFIED, UNSPECIFIED SITE: ICD-10-CM

## 2024-04-17 DIAGNOSIS — M89.9 DISORDER OF BONE, UNSPECIFIED: ICD-10-CM

## 2024-04-17 DIAGNOSIS — C7A.8 OTHER MALIGNANT NEUROENDOCRINE TUMORS: ICD-10-CM

## 2024-04-17 DIAGNOSIS — Z63.4 DISAPPEARANCE AND DEATH OF FAMILY MEMBER: ICD-10-CM

## 2024-04-17 DIAGNOSIS — Z01.818 ENCOUNTER FOR OTHER PREPROCEDURAL EXAMINATION: ICD-10-CM

## 2024-04-17 PROCEDURE — 99443: CPT | Mod: 93

## 2024-04-17 SDOH — SOCIAL STABILITY - SOCIAL INSECURITY: DISSAPEARANCE AND DEATH OF FAMILY MEMBER: Z63.4

## 2024-04-17 NOTE — PHYSICAL EXAM
[Alert] : alert [No Acute Distress] : no acute distress [Well Nourished] : well nourished [Well Developed] : well developed [Healthy Appearance] : healthy appearance [Normal Voice/Communication] : normal voice communication [No Respiratory Distress] : no respiratory distress [Oriented x3] : oriented to person, place, and time [Normal Insight/Judgement] : insight and judgment were intact [Normal Affect] : the affect was normal [Normal Mood] : the mood was normal [Recent Memory Normal] : recent memory was not impaired [Remote Memory Normal] : remote memory was not impaired [Capable of only limited selfcare, confined to bed or chair more than 50% of waking hours] : Capable of only limited selfcare, confined to bed or chair more than 50% of waking hours

## 2024-04-24 NOTE — HISTORY OF PRESENT ILLNESS
[FreeTextEntry1] : Of note, the pt's daughter Ludy Spain, participated in this consult & provided Micronesian translation per the pt's request - declined Micronesian  services. The pt speaks & understands some English.  Mr. 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   Mr Wan reports generalized body aches & pains & has difficulty walking 2/2 pain. Denies localized left iliac pain.   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 Carter. Also has Lung RUL mass which was new on 7/2023. Pt completed 48 Gy / 4 Fx of RT to right lung in 12/2023 with Dr Locke. Recent imaging with new left iliac bone lesion. Referred by Dr. Carter for bx.    Onc: Chip Carter Cardiac: Leandro Merchant

## 2024-04-24 NOTE — REVIEW OF SYSTEMS
[As noted in HPI] : as noted in HPI [Difficulty Walking] : difficulty walking [As Noted in HPI] : as noted in HPI [Negative] : Psychiatric [FreeTextEntry3] : + glasses

## 2024-04-24 NOTE — ASSESSMENT
[Other: _____] : [unfilled] [FreeTextEntry1] : Mr. Ramirez is an 84 y/o male with hx of T2DM, CAD s/p PCI > 10 yrs ago on Plavix, dysphagia, BPH, GI Bleed, HTN, PAD, thrombocytopenia, asthma, prostate CA (2014) s/p external beam radiation, stage 1 high grade neuroendocrine right lung CA s/p sx & systemic chemotherapy, low grade Non-Hodgkin's lymphoma txd with oral chemotherapy, now with new lytic lesion of left iliac bone, who is referred to IR for CT guided biopsy consultation.  # Left Iliac Bone Lesion - pt with history of lung cancer and prostate cancer; has new lytic lesion of left iliac bone - referred by Dr. Carter for outpatient CT guided biopsy, for definitive diagnosis to aid in formulating  plan of care - 3/20 CT demonstrates: >New lytic lesion in the left iliac bone with sclerosis cortical erosion is concerning for new osseous metastasis - imaging reviewed by Dr Peterson and I discussed it with pt & his daughter - procedural benefits, alternatives, risks (bleeding & infection) & expected postprocedural course were discussed at great length -  A cytology tech will be present to evaluate bx samples to determine if adequate tissue is present for diagnosis - biopsy results typically take 3-5 business days or longer if special testing is performed & results will be sent to your referring Dr who will review them with you - Labs - cbc, cmp,  inr - are required, have been ordered & are to be resulted prior to dos - pt requesting home draw given mobility limitations - ECOG 3 - [facility] office to arrange - the pt expressed that he is open to exploring treatment options should bx be positive for malignancy -pt is a  & has a live-in A - daughter states she & the HHA will accompany the pt to & from the hospital bk office will contact to make arrangements for home draw & biopsy   # Thrombocytopenia - plt baseline 90s-120s - bone bx low risk for bleeding, plts sufficient for procedure -  will review pre op plt ct when resulted - has no objections to blood or blood products  # CAD  - denies cardiac symptomatology - managed by Dr Luna - hx of cardiac stent x 2 > 10 yrs ago - on Plavix regimen - denies ASA usage - will discuss feasibility of holding Plavix 5 days pre op to aid in mitigating bleeding risk with Dr. Luna  # T2DM  - A1C 7.1% on 2/24 - maintained on metformin - discussed will need to hold metformin pre op DOS given he will be NPO - FBS pre op dos  # HTN - states well controlled with current antihypertensive(s) regimen - instructed to take antihypertensives pre op dos with sips of water  # Asthma - denies respiratory symptoms - instructed to bring Albuterol inhaler dos - airway evaluation and management as per anesthesia   I have provided the patient the opportunity to ask questions and have answered them to their satisfaction. They are encouraged to contact our office with any further questions, concerns, or issues.  Above case & plan discussed with Dr Peterson.

## 2024-04-24 NOTE — REASON FOR VISIT
[Consultation] : a consultation visit [Verbal consent obtained from patient] : the patient, [unfilled] [Home] : at home, [unfilled] , at the time of the visit. [Medical Office: ([facility])___] : at the medical office located in  [Family Member] : family member [FreeTextEntry1] : CT guided bone biopsy

## 2024-04-24 NOTE — ASSESSMENT
[Other: _____] : [unfilled] [FreeTextEntry1] : Mr. Ramirez is an 84 y/o male with hx of T2DM, CAD s/p PCI > 10 yrs ago on Plavix, dysphagia, BPH, GI Bleed, HTN, PAD, thrombocytopenia, asthma, prostate CA (2014) s/p external beam radiation, stage 1 high grade neuroendocrine right lung CA s/p sx & systemic chemotherapy, low grade Non-Hodgkin's lymphoma txd with oral chemotherapy, now with new lytic lesion of left iliac bone, who is referred to IR for CT guided biopsy consultation.  # Left Iliac Bone Lesion - pt with history of lung cancer and prostate cancer; has new lytic lesion of left iliac bone - referred by Dr. Carter for outpatient CT guided biopsy, for definitive diagnosis to aid in formulating  plan of care - 3/20 CT demonstrates: >New lytic lesion in the left iliac bone with sclerosis cortical erosion is concerning for new osseous metastasis - imaging reviewed by Dr Peterson and I discussed it with pt & his daughter - procedural benefits, alternatives, risks (bleeding & infection) & expected postprocedural course were discussed at great length -  A cytology tech will be present to evaluate bx samples to determine if adequate tissue is present for diagnosis - biopsy results typically take 3-5 business days or longer if special testing is performed & results will be sent to your referring Dr who will review them with you - Labs - cbc, cmp,  inr - are required, have been ordered & are to be resulted prior to dos - pt requesting home draw given mobility limitations - ECOG 3 - [facility] office to arrange - the pt expressed that he is open to exploring treatment options should bx be positive for malignancy -pt is a  & has a live-in A - daughter states she & the HHA will accompany the pt to & from the hospital bk office will contact to make arrangements for home draw & biopsy   # Thrombocytopenia - plt baseline 90s-120s - bone bx low risk for bleeding, plts sufficient for procedure -  will review pre op plt ct when resulted - has no objections to blood or blood products  # CAD  - denies cardiac symptomatology - managed by Dr Luna - hx of cardiac stent x 2 > 10 yrs ago - on Plavix regimen - denies ASA usage - will discuss feasibility of holding Plavix 5 days pre op to aid in mitigating bleeding risk with Dr. Luna  # T2DM  - A1C 7.1% on 2/24 - maintained on metformin - discussed will need to hold metformin pre op DOS given he will be NPO - FBS pre op dos  # HTN - states well controlled with current antihypertensive(s) regimen - instructed to take antihypertensives pre op dos with sips of water  # Asthma - denies respiratory symptoms - instructed to bring Albuterol inhaler dos - airway evaluation and management as per anesthesia   I have provided the patient the opportunity to ask questions and have answered them to their satisfaction. They are encouraged to contact our office with any further questions, concerns, or issues.  Above case & plan discussed with Dr Peterson.

## 2024-04-24 NOTE — REASON FOR VISIT
[Consultation] : a consultation visit [Verbal consent obtained from patient] : the patient, [unfilled] [Home] : at home, [unfilled] , at the time of the visit. [Medical Office: ([facility])___] : at the medical office located in  [Family Member] : family member [FreeTextEntry1] : CT guided bone biopsy

## 2024-04-24 NOTE — DATA REVIEWED
[FreeTextEntry1] : EXAM: [id number] - CT CHEST IC - ORDERED BY: MARILYNN WALKER  EXAM: [id number] - CT ABDOMEN AND PELVIS OC IC - ORDERED BY: MARILYNN WALKER   PROCEDURE DATE: 03/30/2024    INTERPRETATION: CLINICAL INFORMATION: Lung cancer and prostate cancer and lymphoma, status post radiation  COMPARISON: PET CT dated 6/20/2023 and CT chest abdomen and pelvis 3/5/2022.  CONTRAST/COMPLICATIONS: IV Contrast: IV contrast documented in unlinked concurrent exam (accession [id number]), Omnipaque 350 (accession [id number]) 90 cc administered 10 cc discarded Oral Contrast: NONE (accession [id number]), Omnipaque 300 (accession [id number]) Complications: None reported at time of study completion  PROCEDURE: CT of the Chest, Abdomen and Pelvis was performed. Sagittal and coronal reformats were performed.  FINDINGS: CHEST: LUNGS AND LARGE AIRWAYS: Patent central airways. There is mild to moderate centrilobular emphysema. Redemonstration of a right upper lobe mass measuring 1.1 x 1.6 cm, image 4:114, is unchanged since prior PET/CT 8/6/2023 when it measured 1.1 x 1.4 cm accounting for differences in technique and previous motion artifact.  There is interval development of a segmental triangular-shaped peripherally broad-based area of consolidation involving the lateral and posterior segment of the right upper lobe measuring a diameter of approximately 5 cm, image 4:109, with a spiculated opacity extending into the right lower lobe that measures approximately 1.3 cm, image 4:126. Findings most likely relate to postradiation changes.  Additional postsurgical changes and scar tissue in the right lower lobe are stable.  Stable 6 mm opacity at the bifurcation of the right upper lobe, image 4:84, stable 4 mm pulmonary nodule within the left apical lower lobe, image 2:29.  Subtle left lower lobe branching opacities favored to represent mucous impactions.  PLEURA: Minimal right pleural effusion. VESSELS: Normal caliber of the ascending thoracic aorta, 3.8 cm, the pulmonary trunk measures 2.9 cm. The central pulmonary arterial filling defect. HEART: Heart size is borderline enlarged. No pericardial effusion. Coronary artery calcifications. MEDIASTINUM AND ARIE: No lymphadenopathy in the mediastinum or arie, right hilar lymph nodes measure up to 0.8 cm. Right axillary adenopathy and subpectoral adenopathy is noted, for reference: *1.1 cm x 2.0 cm, image 2:26 *1.0 x 1.8 cm, image 2:19 CHEST WALL AND LOWER NECK: Stable lipomatous mass anterior to the right scapula in the axilla measuring 4.2 x 4.0 cm, image 2:20. Right axillary clips.  ABDOMEN AND PELVIS: LIVER: Stable hepatic hypodensities which are likely hepatic cysts. BILE DUCTS: Normal caliber. GALLBLADDER: Cholecystectomy. SPLEEN: Within normal limits. PANCREAS: Within normal limits. ADRENALS: Stable left adrenal nodule, 1.6 cm compared to 3/5/2022. KIDNEYS/URETERS: No hydronephrosis, hydroureter or nephroureterolithiasis. Left renal cysts.  BLADDER: Within normal limits. REPRODUCTIVE ORGANS: Significantly enlarged prostate with a few metallic foci, likely treatment device. Changes in the superior aspect of the prostate suggesting TURP.  BOWEL: No bowel obstruction. Appendix is normal. PERITONEUM: Small amount of pelvic ascites. Small fluid collection in the mesentery, 2.3 cm new from prior study may represent a peritoneal inclusion cyst. No pneumoperitoneum. VESSELS: Moderate arteriosclerotic changes of the abdominal aorta which is normal in caliber. Significant atherosclerotic disease along the end of proximal SMA with thrombotic material layering in narrowing of the lumen. RETROPERITONEUM/LYMPH NODES: No lymphadenopathy. ABDOMINAL WALL: Stable lipomatous mass in the right proximal thigh measuring 4.8 x 7.8 cm. BONES: Degenerative changes of the spine lower cervical spine and mid cervical spine predominantly. New lucency with cortical erosion in the left iliac bone, image 2:133 is concerning for a metastasis and new since prior PET/CT 8/6/2023. Stable bone island in the seventh right posterior rib. Consolidated rib fracture of the 12th and 10th right posterior ribs and 11th left posterior.  IMPRESSION: Right upper lobe lung mass is unchanged with new consolidative opacity in the right upper lobe likely secondary to radiation.  New lytic lesion in the left iliac bone with sclerosis cortical erosion is concerning for new osseous metastasis. Recommend further evaluation with bone scintigraphy.  Right axillary adenopathy is unchanged.  No evidence of new emerging metastatic disease in the abdomen or pelvis.    --- End of Report ---       MARJ GONZALEZ MD; Attending Radiologist This document has been electronically signed. Apr 5 2024 5:36PM

## 2024-04-24 NOTE — DATA REVIEWED
[FreeTextEntry1] : EXAM: [id number] - CT CHEST IC - ORDERED BY: MARILYNN WALKER  EXAM: [id number] - CT ABDOMEN AND PELVIS OC IC - ORDERED BY: MARILYNN WALKER   PROCEDURE DATE: 03/30/2024    INTERPRETATION: CLINICAL INFORMATION: Lung cancer and prostate cancer and lymphoma, status post radiation  COMPARISON: PET CT dated 6/20/2023 and CT chest abdomen and pelvis 3/5/2022.  CONTRAST/COMPLICATIONS: IV Contrast: IV contrast documented in unlinked concurrent exam (accession [id number]), Omnipaque 350 (accession [id number]) 90 cc administered 10 cc discarded Oral Contrast: NONE (accession [id number]), Omnipaque 300 (accession [id number]) Complications: None reported at time of study completion  PROCEDURE: CT of the Chest, Abdomen and Pelvis was performed. Sagittal and coronal reformats were performed.  FINDINGS: CHEST: LUNGS AND LARGE AIRWAYS: Patent central airways. There is mild to moderate centrilobular emphysema. Redemonstration of a right upper lobe mass measuring 1.1 x 1.6 cm, image 4:114, is unchanged since prior PET/CT 8/6/2023 when it measured 1.1 x 1.4 cm accounting for differences in technique and previous motion artifact.  There is interval development of a segmental triangular-shaped peripherally broad-based area of consolidation involving the lateral and posterior segment of the right upper lobe measuring a diameter of approximately 5 cm, image 4:109, with a spiculated opacity extending into the right lower lobe that measures approximately 1.3 cm, image 4:126. Findings most likely relate to postradiation changes.  Additional postsurgical changes and scar tissue in the right lower lobe are stable.  Stable 6 mm opacity at the bifurcation of the right upper lobe, image 4:84, stable 4 mm pulmonary nodule within the left apical lower lobe, image 2:29.  Subtle left lower lobe branching opacities favored to represent mucous impactions.  PLEURA: Minimal right pleural effusion. VESSELS: Normal caliber of the ascending thoracic aorta, 3.8 cm, the pulmonary trunk measures 2.9 cm. The central pulmonary arterial filling defect. HEART: Heart size is borderline enlarged. No pericardial effusion. Coronary artery calcifications. MEDIASTINUM AND ARIE: No lymphadenopathy in the mediastinum or arie, right hilar lymph nodes measure up to 0.8 cm. Right axillary adenopathy and subpectoral adenopathy is noted, for reference: *1.1 cm x 2.0 cm, image 2:26 *1.0 x 1.8 cm, image 2:19 CHEST WALL AND LOWER NECK: Stable lipomatous mass anterior to the right scapula in the axilla measuring 4.2 x 4.0 cm, image 2:20. Right axillary clips.  ABDOMEN AND PELVIS: LIVER: Stable hepatic hypodensities which are likely hepatic cysts. BILE DUCTS: Normal caliber. GALLBLADDER: Cholecystectomy. SPLEEN: Within normal limits. PANCREAS: Within normal limits. ADRENALS: Stable left adrenal nodule, 1.6 cm compared to 3/5/2022. KIDNEYS/URETERS: No hydronephrosis, hydroureter or nephroureterolithiasis. Left renal cysts.  BLADDER: Within normal limits. REPRODUCTIVE ORGANS: Significantly enlarged prostate with a few metallic foci, likely treatment device. Changes in the superior aspect of the prostate suggesting TURP.  BOWEL: No bowel obstruction. Appendix is normal. PERITONEUM: Small amount of pelvic ascites. Small fluid collection in the mesentery, 2.3 cm new from prior study may represent a peritoneal inclusion cyst. No pneumoperitoneum. VESSELS: Moderate arteriosclerotic changes of the abdominal aorta which is normal in caliber. Significant atherosclerotic disease along the end of proximal SMA with thrombotic material layering in narrowing of the lumen. RETROPERITONEUM/LYMPH NODES: No lymphadenopathy. ABDOMINAL WALL: Stable lipomatous mass in the right proximal thigh measuring 4.8 x 7.8 cm. BONES: Degenerative changes of the spine lower cervical spine and mid cervical spine predominantly. New lucency with cortical erosion in the left iliac bone, image 2:133 is concerning for a metastasis and new since prior PET/CT 8/6/2023. Stable bone island in the seventh right posterior rib. Consolidated rib fracture of the 12th and 10th right posterior ribs and 11th left posterior.  IMPRESSION: Right upper lobe lung mass is unchanged with new consolidative opacity in the right upper lobe likely secondary to radiation.  New lytic lesion in the left iliac bone with sclerosis cortical erosion is concerning for new osseous metastasis. Recommend further evaluation with bone scintigraphy.  Right axillary adenopathy is unchanged.  No evidence of new emerging metastatic disease in the abdomen or pelvis.    --- End of Report ---       MARJ GONZALEZ MD; Attending Radiologist This document has been electronically signed. Apr 5 2024 5:36PM

## 2024-04-24 NOTE — HISTORY OF PRESENT ILLNESS
[FreeTextEntry1] : Of note, the pt's daughter Ludy Spain, participated in this consult & provided Pakistani translation per the pt's request - declined Pakistani  services. The pt speaks & understands some English.  Mr. 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   Mr Wan reports generalized body aches & pains & has difficulty walking 2/2 pain. Denies localized left iliac pain.   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 Carter. Also has Lung RUL mass which was new on 7/2023. Pt completed 48 Gy / 4 Fx of RT to right lung in 12/2023 with Dr Locke. Recent imaging with new left iliac bone lesion. Referred by Dr. Carter for bx.    Onc: Chip Carter Cardiac: Leandro Merchant

## 2024-05-02 ENCOUNTER — LABORATORY RESULT (OUTPATIENT)
Age: 84
End: 2024-05-02

## 2024-05-07 ENCOUNTER — RESULT REVIEW (OUTPATIENT)
Age: 84
End: 2024-05-07

## 2024-05-07 ENCOUNTER — OUTPATIENT (OUTPATIENT)
Dept: OUTPATIENT SERVICES | Facility: HOSPITAL | Age: 84
LOS: 1 days | End: 2024-05-07
Payer: MEDICARE

## 2024-05-07 ENCOUNTER — TRANSCRIPTION ENCOUNTER (OUTPATIENT)
Age: 84
End: 2024-05-07

## 2024-05-07 VITALS
OXYGEN SATURATION: 96 % | HEART RATE: 71 BPM | RESPIRATION RATE: 15 BRPM | DIASTOLIC BLOOD PRESSURE: 71 MMHG | SYSTOLIC BLOOD PRESSURE: 159 MMHG

## 2024-05-07 VITALS
TEMPERATURE: 98 F | DIASTOLIC BLOOD PRESSURE: 60 MMHG | HEART RATE: 85 BPM | SYSTOLIC BLOOD PRESSURE: 162 MMHG | OXYGEN SATURATION: 100 % | RESPIRATION RATE: 15 BRPM

## 2024-05-07 DIAGNOSIS — C61 MALIGNANT NEOPLASM OF PROSTATE: ICD-10-CM

## 2024-05-07 PROCEDURE — 20220 BONE BIOPSY TROCAR/NDL SUPFC: CPT

## 2024-05-07 PROCEDURE — 77012 CT SCAN FOR NEEDLE BIOPSY: CPT

## 2024-05-07 PROCEDURE — 88172 CYTP DX EVAL FNA 1ST EA SITE: CPT | Mod: 26

## 2024-05-07 PROCEDURE — 88173 CYTOPATH EVAL FNA REPORT: CPT

## 2024-05-07 PROCEDURE — 88360 TUMOR IMMUNOHISTOCHEM/MANUAL: CPT

## 2024-05-07 PROCEDURE — 88172 CYTP DX EVAL FNA 1ST EA SITE: CPT

## 2024-05-07 PROCEDURE — 88307 TISSUE EXAM BY PATHOLOGIST: CPT

## 2024-05-07 PROCEDURE — 88342 IMHCHEM/IMCYTCHM 1ST ANTB: CPT

## 2024-05-07 PROCEDURE — 88307 TISSUE EXAM BY PATHOLOGIST: CPT | Mod: 26

## 2024-05-07 PROCEDURE — 88342 IMHCHEM/IMCYTCHM 1ST ANTB: CPT | Mod: 26,59

## 2024-05-07 PROCEDURE — 77012 CT SCAN FOR NEEDLE BIOPSY: CPT | Mod: 26

## 2024-05-07 PROCEDURE — 88341 IMHCHEM/IMCYTCHM EA ADD ANTB: CPT | Mod: 26,59

## 2024-05-07 PROCEDURE — 88173 CYTOPATH EVAL FNA REPORT: CPT | Mod: 26

## 2024-05-07 PROCEDURE — 88360 TUMOR IMMUNOHISTOCHEM/MANUAL: CPT | Mod: 26

## 2024-05-07 PROCEDURE — 88341 IMHCHEM/IMCYTCHM EA ADD ANTB: CPT

## 2024-05-07 PROCEDURE — 88305 TISSUE EXAM BY PATHOLOGIST: CPT | Mod: 26

## 2024-05-07 PROCEDURE — 88305 TISSUE EXAM BY PATHOLOGIST: CPT

## 2024-05-07 RX ORDER — CARVEDILOL PHOSPHATE 80 MG/1
1 CAPSULE, EXTENDED RELEASE ORAL
Refills: 0 | DISCHARGE

## 2024-05-07 RX ORDER — METFORMIN HYDROCHLORIDE 850 MG/1
1 TABLET ORAL
Refills: 0 | DISCHARGE

## 2024-05-07 RX ORDER — CLOPIDOGREL BISULFATE 75 MG/1
1 TABLET, FILM COATED ORAL
Refills: 0 | DISCHARGE

## 2024-05-07 RX ORDER — LOSARTAN POTASSIUM 100 MG/1
1 TABLET, FILM COATED ORAL
Refills: 0 | DISCHARGE

## 2024-05-07 RX ORDER — SIMVASTATIN 20 MG/1
1 TABLET, FILM COATED ORAL
Refills: 0 | DISCHARGE

## 2024-05-07 NOTE — PACU DISCHARGE NOTE - NSPTMEETSDISCHCRITERIADT_GEN_A_CORE
normal appearance, without tenderness upon palpation, no deformities, no cervical lymphadenopathy, no masses, no thyroid nodules, Thyroid normal size, no JVD, thyroid nontender
07-May-2024 16:07

## 2024-05-07 NOTE — PRE PROCEDURE NOTE - PRE PROCEDURE EVALUATION
Interventional Radiology Pre-Procedure Note    This is a 83y Male with left iliac lytic region. Biopsy is requested.       PAST MEDICAL & SURGICAL HISTORY:  Diabetes      Lipoma      HTN (hypertension)      Pulmonary nodule, right      Non-Hodgkin lymphoma      CAD (coronary artery disease)      Stented coronary artery  2 stents, [id number]?      Hyperlipidemia      Lung cancer      Neuroendocrine cancer      Prostate cancer      History of renal insufficiency      Lymphoma      PAD (peripheral artery disease)      H/O thrombocytopenia      Acute anterior wall MI      H/O emphysema      s/p Angioplasty with Stent      S/P laparoscopic cholecystectomy      Status post lung surgery           Vitals:Vital Signs Last 24 Hrs  T(C): 36.7 (07 May 2024 10:07), Max: 36.7 (07 May 2024 10:07)  T(F): 98 (07 May 2024 10:07), Max: 98 (07 May 2024 10:07)  HR: 85 (07 May 2024 10:07) (85 - 85)  BP: 162/60 (07 May 2024 10:07) (162/60 - 162/60)  BP(mean): --  RR: 15 (07 May 2024 10:07) (15 - 15)  SpO2: 100% (07 May 2024 10:07) (100% - 100%)        Allergies: Allergies    penicillin (Urticaria; Rash)    Intolerances    Physical Exam:   AOx3    Procedure and risks discussed with patient and he is agreeable to proceed.     Informed consent obtained. All questions and concerns have been addressed at this time.

## 2024-05-07 NOTE — ASU PATIENT PROFILE, ADULT - FALL HARM RISK - RISK INTERVENTIONS

## 2024-05-07 NOTE — ASU DISCHARGE PLAN (ADULT/PEDIATRIC) - NURSING INSTRUCTIONS
Please feel free to contact us at [phone] if any problems arise. After 6PM, Monday through Friday, on weekends and on holidays, please call [phone] and ask for the radiology resident on call to be paged.

## 2024-05-07 NOTE — ASU DISCHARGE PLAN (ADULT/PEDIATRIC) - ASU DC SPECIAL INSTRUCTIONSFT
You had a left iliac lesion biopsy. There is a dressing over the access site which can be removed in 1-2 days. The results come back in 3-5 days and will be sent to Dr. Carter. If you notice any new concerning symptoms including pain, please call us at .

## 2024-05-22 ENCOUNTER — APPOINTMENT (OUTPATIENT)
Dept: HEMATOLOGY ONCOLOGY | Facility: CLINIC | Age: 84
End: 2024-05-22
Payer: MEDICARE

## 2024-05-22 DIAGNOSIS — C79.51 SECONDARY MALIGNANT NEOPLASM OF BONE: ICD-10-CM

## 2024-05-22 DIAGNOSIS — C80.1 SECONDARY MALIGNANT NEOPLASM OF BONE: ICD-10-CM

## 2024-05-22 LAB — NON-GYNECOLOGICAL CYTOLOGY STUDY: SIGNIFICANT CHANGE UP

## 2024-05-22 PROCEDURE — 99443: CPT | Mod: 93

## 2024-05-22 PROCEDURE — G2211 COMPLEX E/M VISIT ADD ON: CPT

## 2024-05-26 NOTE — HISTORY OF PRESENT ILLNESS
[FreeTextEntry1] : 84 yo male with history of [place] 6 prostate cancer s/p radiation in 2015.  Last PSA 0.41 4/2023  He has also been on flomax for urinary symptoms. Currently does not have any complaints. Denies dysuria, frequency, urgency, hematuria. Does have nocturia x 3-4.   Recent femur lesion s/p biopsy - c/w carcinoma. Possible UCC  Also with ED - reports inability to get an erection.  Has significant cardiac history, including inferior wall MI status post PCI, blood clot, LV dysfunction, hypertension, hyperlipidemia.

## 2024-05-26 NOTE — ASSESSMENT
[FreeTextEntry1] : 81 yo male with:\par  \par  #[place] 6 prostate cancer sp 6 weeks of radiation\par  - PSA 4/2023 0.41\par  - Trend reviewed\par  - Annual PSA\par  \par  #LUTS\par  - PVR 47 mL\par  - Continue flomax\par  - UA\par  \par  #ED\par  - Significant cardiac comorbidities - will ask cardiologist for clearance for PDE5i

## 2024-05-28 ENCOUNTER — APPOINTMENT (OUTPATIENT)
Dept: RADIATION ONCOLOGY | Facility: CLINIC | Age: 84
End: 2024-05-28
Payer: MEDICARE

## 2024-05-28 DIAGNOSIS — C34.91 MALIGNANT NEOPLASM OF UNSPECIFIED PART OF RIGHT BRONCHUS OR LUNG: ICD-10-CM

## 2024-05-28 PROCEDURE — 99443: CPT | Mod: 93

## 2024-05-28 RX ORDER — OXYCODONE AND ACETAMINOPHEN 5; 325 MG/1; MG/1
5-325 TABLET ORAL
Qty: 45 | Refills: 0 | Status: ACTIVE | COMMUNITY
Start: 2024-05-28 | End: 1900-01-01

## 2024-05-28 NOTE — REASON FOR VISIT
[Routine Follow-Up] : routine follow-up visit for [Home] : at home, [unfilled] , at the time of the visit. [Medical Office: ([facility])___] : at the medical office located in  [Family Member] : family member [Verbal consent obtained from patient] : the patient, [unfilled]

## 2024-05-29 ENCOUNTER — NON-APPOINTMENT (OUTPATIENT)
Age: 84
End: 2024-05-29

## 2024-05-30 ENCOUNTER — OUTPATIENT (OUTPATIENT)
Dept: OUTPATIENT SERVICES | Facility: HOSPITAL | Age: 84
LOS: 1 days | End: 2024-05-30
Payer: MEDICARE

## 2024-05-30 ENCOUNTER — APPOINTMENT (OUTPATIENT)
Dept: CT IMAGING | Facility: HOSPITAL | Age: 84
End: 2024-05-30

## 2024-05-30 DIAGNOSIS — C79.51 SECONDARY MALIGNANT NEOPLASM OF BONE: ICD-10-CM

## 2024-05-30 PROCEDURE — 77290 THER RAD SIMULAJ FIELD CPLX: CPT

## 2024-06-01 ENCOUNTER — APPOINTMENT (OUTPATIENT)
Dept: NUCLEAR MEDICINE | Facility: IMAGING CENTER | Age: 84
End: 2024-06-01
Payer: MEDICARE

## 2024-06-01 ENCOUNTER — OUTPATIENT (OUTPATIENT)
Dept: OUTPATIENT SERVICES | Facility: HOSPITAL | Age: 84
LOS: 1 days | End: 2024-06-01
Payer: MEDICARE

## 2024-06-01 DIAGNOSIS — C79.51 SECONDARY MALIGNANT NEOPLASM OF BONE: ICD-10-CM

## 2024-06-01 PROCEDURE — 78816 PET IMAGE W/CT FULL BODY: CPT | Mod: 26,PI,MH

## 2024-06-01 PROCEDURE — 78816 PET IMAGE W/CT FULL BODY: CPT

## 2024-06-01 PROCEDURE — A9552: CPT

## 2024-06-03 ENCOUNTER — NON-APPOINTMENT (OUTPATIENT)
Age: 84
End: 2024-06-03

## 2024-06-03 NOTE — HISTORY OF PRESENT ILLNESS
[FreeTextEntry1] : Mr. Wan is an 83 year old male former smoker with PMHx of T2DM, PAD, HTN, HLD, NHL diagnosed via bone biopsy (s/p Rituxan [id number], prostate cancer s/p EBRT 11/2014, high grade pT1aN0 neuroendocrine lung cancer s/p wedge resection + adjuvant chemo in 2013, now presenting with a new lung nodule appreciated on surveillance CT scan 7/2/23. For this, he is s/p bronchoscopy with biopsy with significant bleeding and only producing tissue insufficient for diagnosis. h/o radiation to right lung in 12/2023 7/2023 - CT scan: new solid, irregularly marginated 1.5 cm nodule in the right upper lobe (series 2, image 58); a subsegmental bronchus extends into the lesion. - Other small nodules, including a 6 mm posterior RUL nodule (image 42) and 4 mm superior LLL nodule (image 43) remain unchanged. - Status post right axillary abdiel dissection, with unchanged right axillary lymph nodes measuring up to 1.4 cm short axis. No left axillary, mediastinal or hilar lymphadenopathy. Unremarkable thyroid. - s/p Cholecystectomy. Stable 1.6 cm left adrenal nodule. - No aggressive bone lesions. Right posterior rib deformities may be related to prior thoracotomy. 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  PET/CT on 8/6/2023: - FDG avid right parotid nodule, 1.1 x 0.6 cm, SUV 4 (image 57) - 5-10 FDG avid right axillary lymph nodes, with index node 1.8 x 1.5 cm, SUV 7.6 (image 101) ; FDG avid subcarinal nodes, with index node in the left, 1.4 x 0.8 cm, SUV 6 (image 120) - Symmetrical low-level activity in the tracey. FDG avid paravertebral soft tissue, for example a focus measuring 2.7 x 1.7 cm superior to the aortic arch, SUV 6, image 102. More inferiorly there is FDG avid left paravertebral soft tissue, difficult to delineate on CT, SUV 5, image 139. - FDG avid RUL nodule, 1.5 x 0.8 cm, SUV 2.8 (image 118) similar to recent CT. - Postsurgical change in the RLL. - Similar 6 mm posterior RUL nodule (image 108) - Similar 4 mm superior LLL (image 43) - Emphysema. - FDG avid right inguinal lymph node with a fatty hilum, 1.3 x 1.2 cm, SUV 4 (image 264); previously 0.9 x 0.8 cm and not FDG avid. FDG avid soft tissue anterior to the inferior lumbar spine and sacrum, difficult to delineate on CT, SUV 6.5 (image 218) - Coronary and large vessel calcifications. 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 Wan presents for radiation therapy recommendations. He reports feeling well.   12/15/2023 OTV. 4/4 fractions of radiation to right lung completed. c/o SOB on exertion. No chest pain, headache. Advised to continue f/u with surgeon and Med Onc Dr Carter @ [facility].  4/2024-- CT scan showed IMPRESSION: Right upper lobe lung mass is unchanged with new consolidative opacity in the right upper lobe likely secondary to radiation. New lytic lesion in the left iliac bone with sclerosis cortical erosion is concerning for new osseous metastasis. Recommend further evaluation with bone scintigraphy. Right axillary adenopathy is unchanged. No evidence of new emerging metastatic disease in the abdomen or pelvis.  5/7/2024 -- pathology showed Final Diagnosis BONE, ILIAC, LEFT, CT GUIDED CORE BIOPSY AND FNA: POSITIVE FOR MALIGNANT CELLS. Metastatic carcinoma.  See Note.  5/28/2024 F/U. Pt completed 48 Gy / 4 Fx of RT to right lung on 12/15/2023. Left hip pain is managed by Med Onc team.

## 2024-06-03 NOTE — REVIEW OF SYSTEMS
[FreeTextEntry5] : HTN [FreeTextEntry6] : h/o surgery for lung mass [FreeTextEntry8] : h/o radiation to prostate cancer

## 2024-06-04 ENCOUNTER — NON-APPOINTMENT (OUTPATIENT)
Age: 84
End: 2024-06-04

## 2024-06-05 ENCOUNTER — NON-APPOINTMENT (OUTPATIENT)
Age: 84
End: 2024-06-05

## 2024-06-06 ENCOUNTER — APPOINTMENT (OUTPATIENT)
Dept: UROLOGY | Facility: CLINIC | Age: 84
End: 2024-06-06

## 2024-06-06 ENCOUNTER — NON-APPOINTMENT (OUTPATIENT)
Age: 84
End: 2024-06-06

## 2024-06-14 NOTE — PATIENT PROFILE ADULT - DOES PATIENT HAVE ADVANCE DIRECTIVE
Infusion Nursing Note:  Eliezer Izquierdo presents today for Taxol C10D1.    Patient seen by provider today: No   present during visit today: Not Applicable.    Note: Patient states she hasn't been sleeping well this last week. She is starting a new medication today for neuropathy and is hopeful that will help.    Immediately after starting emend/dex premed, patient states she was on a dex taper and only received 4mg dex with her last infusion. Today's ordered dose was 12mg. Patient gets thrush from the steroids. Stopped infusion and reviewed chart. Reached out to Ene Mary NP. Per Ene okay to remove dex (patient's preference) vs continuing the 4mg dose. If patient feels her nausea is worse this upcoming week, Ene will add back the 4mg Dex dose as a premed prior to her next cycle. Patient verbalized understanding and is in agreement with the plan.    Intravenous Access:  Implanted Port.    Treatment Conditions:  Lab Results   Component Value Date    HGB 13.0 06/14/2024    WBC 8.6 06/14/2024    ANEU 10.7 (H) 05/17/2024    ANEUTAUTO 6.2 06/14/2024     06/14/2024   Results reviewed, labs MET treatment parameters, ok to proceed with treatment.    Post Infusion Assessment:  Patient tolerated infusion without incident.  Blood return noted pre and post infusion.  Site patent and intact, free from redness, edema or discomfort.  No evidence of extravasations.  Access discontinued per protocol.     Discharge Plan:   Discharge instructions reviewed with: Patient.  Patient and/or family verbalized understanding of discharge instructions and all questions answered.  AVS to patient via [employer].  Patient will return 6/21/2024 for next appointment.   Patient discharged in stable condition accompanied by: self.  Departure Mode: Ambulatory.    Argelia Horton RN  
No

## 2024-06-17 ENCOUNTER — APPOINTMENT (OUTPATIENT)
Dept: RADIATION ONCOLOGY | Facility: CLINIC | Age: 84
End: 2024-06-17
Payer: MEDICARE

## 2024-06-17 DIAGNOSIS — Z92.3 PERSONAL HISTORY OF IRRADIATION: ICD-10-CM

## 2024-06-17 PROCEDURE — 99024 POSTOP FOLLOW-UP VISIT: CPT

## 2024-06-17 NOTE — REVIEW OF SYSTEMS
[Negative] : Allergic/Immunologic [Dysphagia: Grade 0] : Dysphagia: Grade 0 [Cough: Grade 0] : Cough: Grade 0 [Shortness Of Breath] : shortness of breath [Wheezing] : wheezing [Cough] : cough [SOB on Exertion] : shortness of breath during exertion [Joint Pain] : joint pain [Muscle Pain] : muscle pain [Muscle Weakness] : muscle weakness [Easy Bleeding] : a tendency for easy bleeding [Disturbance Of Gait] : gait disturbance [Easy Bruising] : a tendency for easy bruising [Dyspnea: Grade 1 - Shortness of breath with moderate exertion] : Dyspnea: Grade 1 - Shortness of breath with moderate exertion [Swollen Glands] : no swollen glands [FreeTextEntry5] : HTN [FreeTextEntry6] : h/o surgery for lung mass [FreeTextEntry8] : h/o radiation to prostate cancer [de-identified] : plavix

## 2024-06-17 NOTE — REASON FOR VISIT
[Re-evaluation] : re-evaluation for [Bone Metastasis] : bone metastasis [Lung Cancer] : lung cancer [Home] : at home, [unfilled] , at the time of the visit. [Medical Office: ([facility])___] : at the medical office located in  [Other:____] : [unfilled] [Verbal consent obtained from patient] : the patient, [unfilled] [FreeTextEntry4] : Charissa William

## 2024-06-17 NOTE — HISTORY OF PRESENT ILLNESS
[Medical Office: ([facility])___] : at the medical office located in  [FreeTextEntry1] : Mr. Wan is an 83 year old male former smoker with PMHx of T2DM, PAD, HTN, HLD, NHL diagnosed via bone biopsy (s/p Rituxan [id number], prostate cancer s/p EBRT 11/2014, high grade pT1aN0 neuroendocrine lung cancer s/p wedge resection + adjuvant chemo in 2013, now presenting with a new lung nodule appreciated on surveillance CT scan 7/2/23. For this, he is s/p bronchoscopy with biopsy with significant bleeding and only producing tissue insufficient for diagnosis. H/o radiation to right lung in 12/2023 7/2023 - CT scan: new solid, irregularly marginated 1.5 cm nodule in the right upper lobe (series 2, image 58); a subsegmental bronchus extends into the lesion. - Other small nodules, including a 6 mm posterior RUL nodule (image 42) and 4 mm superior LLL nodule (image 43) remain unchanged. - Status post right axillary abdiel dissection, with unchanged right axillary lymph nodes measuring up to 1.4 cm short axis. No left axillary, mediastinal or hilar lymphadenopathy. Unremarkable thyroid. - s/p Cholecystectomy. Stable 1.6 cm left adrenal nodule. - No aggressive bone lesions. Right posterior rib deformities may be related to prior thoracotomy. 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  PET/CT on 8/6/2023: - FDG avid right parotid nodule, 1.1 x 0.6 cm, SUV 4 (image 57) - 5-10 FDG avid right axillary lymph nodes, with index node 1.8 x 1.5 cm, SUV 7.6 (image 101) ; FDG avid subcarinal nodes, with index node in the left, 1.4 x 0.8 cm, SUV 6 (image 120) - Symmetrical low-level activity in the tracey. FDG avid paravertebral soft tissue, for example a focus measuring 2.7 x 1.7 cm superior to the aortic arch, SUV 6, image 102. More inferiorly there is FDG avid left paravertebral soft tissue, difficult to delineate on CT, SUV 5, image 139. - FDG avid RUL nodule, 1.5 x 0.8 cm, SUV 2.8 (image 118) similar to recent CT. - Postsurgical change in the RLL. - Similar 6 mm posterior RUL nodule (image 108) - Similar 4 mm superior LLL (image 43) - Emphysema. - FDG avid right inguinal lymph node with a fatty hilum, 1.3 x 1.2 cm, SUV 4 (image 264); previously 0.9 x 0.8 cm and not FDG avid. FDG avid soft tissue anterior to the inferior lumbar spine and sacrum, difficult to delineate on CT, SUV 6.5 (image 218) - Coronary and large vessel calcifications. 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 Wan presents for radiation therapy recommendations. He reports feeling well.   12/15/2023 Completed SBRT to right lung total dose of 4800cGy in 4 fractions with Dr. Locke.   3/30/2024 CT Chest, Abdomen and Pelvis: IMPRESSION: Right upper lobe lung mass is unchanged with new consolidative opacity in the right upper lobe likely secondary to radiation. New lytic lesion in the left iliac bone with sclerosis cortical erosion is concerning for new osseous metastasis. Recommend further evaluation with bone scintigraphy. Right axillary adenopathy is unchanged. No evidence of new emerging metastatic disease in the abdomen or pelvis.  5/7/2024 -- Fine Needle Aspiration Report - Auth (Verified) Specimen(s) Submitted BONE, ILIAC, LEFT, CT GUIDED CORE BIOPSY AND FNA Final Diagnosis BONE, ILIAC, LEFT, CT GUIDED CORE BIOPSY AND FNA POSITIVE FOR MALIGNANT CELLS. Metastatic carcinoma.   6/1/2024 PETCT: IMPRESSION: Compared to FDG-PET/CT scan dated 8/6/2023: 1. FDG-avid destructive lesion in left iliac bone, extending into the adjacent muscle, is increased in size since 3/30/2024, and is new from prior PET/CT, corresponding to previously biopsied lesion. Additional difficult to delineate FDG-avid lesions in the posterior left iliac bone, upper sacrum, anterior aspect of L5 vertebral body, approximately T7 vertebral body, left pubic bone, and left acetabulum are suspicious for metastatic disease. MRI of thoracolumbar spine and pelvis may be obtained for further evaluation. 2. Probable post radiation therapy changes in right lung, new from prior PET/CT, and increased in size since 3/30/2024, and obscuring previously seen FDG-avid right lung nodule. 3. FDG-avid left lower paravertebral soft tissue is increased in metabolism (SUV 8.1; image 168; previous SUV 5.2). Hypermetabolism surrounding distal descending thoracic aorta also is increased in metabolism (SUV 6.0; image 170; previous SUV 3.5). Findings may be secondary to lymphoma and/or an inflammatory etiology/vasculitis. 4. FDG-avid right axillary/retropectoral lymph nodes are not significantly changed. FDG-avid mediastinal and hilar lymph nodes are similar in distribution and decreased in metabolism. 5. Mildly FDG-avid left pleural thickening/trace pleural effusion, increased as compared to prior study 6. Probable right frontal meningioma, not significantly changed. MRI of brain with contrast is recommended for confirmation.  Presents today for re-evaluation for radiation to left iliac bone metastasis.

## 2024-06-18 ENCOUNTER — APPOINTMENT (OUTPATIENT)
Dept: CT IMAGING | Facility: IMAGING CENTER | Age: 84
End: 2024-06-18
Payer: MEDICARE

## 2024-06-18 PROCEDURE — 71260 CT THORAX DX C+: CPT | Mod: 26,MH

## 2024-07-01 ENCOUNTER — NON-APPOINTMENT (OUTPATIENT)
Age: 84
End: 2024-07-01

## 2024-07-02 ENCOUNTER — NON-APPOINTMENT (OUTPATIENT)
Age: 84
End: 2024-07-02

## 2024-07-04 ENCOUNTER — NON-APPOINTMENT (OUTPATIENT)
Age: 84
End: 2024-07-04

## 2024-07-08 ENCOUNTER — APPOINTMENT (OUTPATIENT)
Dept: MRI IMAGING | Facility: IMAGING CENTER | Age: 84
End: 2024-07-08

## 2024-07-10 ENCOUNTER — APPOINTMENT (OUTPATIENT)
Age: 84
End: 2024-07-10

## 2024-08-21 ENCOUNTER — APPOINTMENT (OUTPATIENT)
Dept: RADIATION ONCOLOGY | Facility: CLINIC | Age: 84
End: 2024-08-21
